# Patient Record
Sex: MALE | Race: WHITE | NOT HISPANIC OR LATINO | Employment: FULL TIME | URBAN - METROPOLITAN AREA
[De-identification: names, ages, dates, MRNs, and addresses within clinical notes are randomized per-mention and may not be internally consistent; named-entity substitution may affect disease eponyms.]

---

## 2023-08-21 PROBLEM — D36.11: Status: ACTIVE | Noted: 2023-08-21

## 2023-08-21 PROBLEM — N40.0 BENIGN PROSTATIC HYPERPLASIA: Status: ACTIVE | Noted: 2023-08-21

## 2023-08-21 PROBLEM — M10.9 GOUT: Status: ACTIVE | Noted: 2023-08-21

## 2023-08-21 PROBLEM — D36.10 NEUROMA: Status: ACTIVE | Noted: 2023-08-21

## 2023-08-21 PROBLEM — R29.810 WEAKNESS ON LEFT SIDE OF FACE: Status: ACTIVE | Noted: 2023-08-21

## 2023-08-21 PROBLEM — M48.04 THORACIC SPINAL STENOSIS: Status: ACTIVE | Noted: 2023-08-21

## 2023-08-21 PROBLEM — H90.5 SENSORINEURAL HEARING LOSS, UNILATERAL: Status: ACTIVE | Noted: 2023-08-21

## 2023-08-21 PROBLEM — R10.33 PERIUMBILICAL ABDOMINAL PAIN: Status: ACTIVE | Noted: 2023-08-21

## 2023-08-21 PROBLEM — H90.72 MIXED HEARING LOSS OF LEFT EAR: Status: ACTIVE | Noted: 2023-08-21

## 2023-08-21 PROBLEM — K42.9 UMBILICAL HERNIA WITHOUT OBSTRUCTION AND WITHOUT GANGRENE: Status: ACTIVE | Noted: 2023-08-21

## 2023-08-21 PROBLEM — H90.8 MIXED HEARING LOSS, UNILATERAL: Status: ACTIVE | Noted: 2023-08-21

## 2023-08-21 PROBLEM — M48.50XA COMPRESSION FRACTURE OF VERTEBRAL COLUMN (MULTI): Status: ACTIVE | Noted: 2023-08-21

## 2023-08-21 PROBLEM — G51.0 FACIAL PARALYSIS: Status: ACTIVE | Noted: 2023-08-21

## 2023-08-21 PROBLEM — H90.5 SENSORINEURAL HEARING LOSS OF RIGHT EAR: Status: ACTIVE | Noted: 2023-08-21

## 2023-08-21 RX ORDER — LEVOFLOXACIN 500 MG/1
1 TABLET, FILM COATED ORAL DAILY
COMMUNITY
End: 2023-10-06 | Stop reason: ALTCHOICE

## 2023-08-21 RX ORDER — OXYCODONE HYDROCHLORIDE 5 MG/1
1 TABLET ORAL EVERY 8 HOURS PRN
COMMUNITY
End: 2023-10-06 | Stop reason: ALTCHOICE

## 2023-08-21 RX ORDER — ALFUZOSIN HYDROCHLORIDE 10 MG/1
1 TABLET, EXTENDED RELEASE ORAL DAILY
COMMUNITY
End: 2023-10-06 | Stop reason: SDUPTHER

## 2023-08-21 RX ORDER — ACETAMINOPHEN 325 MG/1
2 TABLET ORAL 4 TIMES DAILY
COMMUNITY
End: 2023-10-06 | Stop reason: ALTCHOICE

## 2023-08-21 RX ORDER — ALLOPURINOL 300 MG/1
1 TABLET ORAL DAILY
COMMUNITY
End: 2023-10-06 | Stop reason: SDUPTHER

## 2023-09-29 ENCOUNTER — HOSPITAL ENCOUNTER (OUTPATIENT)
Dept: DATA CONVERSION | Facility: HOSPITAL | Age: 58
Discharge: HOME | End: 2023-09-29
Payer: COMMERCIAL

## 2023-09-29 DIAGNOSIS — R73.9 HYPERGLYCEMIA, UNSPECIFIED: ICD-10-CM

## 2023-09-29 DIAGNOSIS — Z12.5 ENCOUNTER FOR SCREENING FOR MALIGNANT NEOPLASM OF PROSTATE: ICD-10-CM

## 2023-09-29 DIAGNOSIS — Z87.39 PERSONAL HISTORY OF OTHER DISEASES OF THE MUSCULOSKELETAL SYSTEM AND CONNECTIVE TISSUE: ICD-10-CM

## 2023-09-29 DIAGNOSIS — E55.9 VITAMIN D DEFICIENCY, UNSPECIFIED: ICD-10-CM

## 2023-09-29 DIAGNOSIS — E03.9 HYPOTHYROIDISM, UNSPECIFIED: ICD-10-CM

## 2023-09-29 DIAGNOSIS — Z01.89 ENCOUNTER FOR OTHER SPECIFIED SPECIAL EXAMINATIONS: ICD-10-CM

## 2023-09-29 DIAGNOSIS — E78.2 MIXED HYPERLIPIDEMIA: ICD-10-CM

## 2023-09-29 LAB
25(OH)D3 SERPL-MCNC: 25 NG/ML (ref 31–100)
ALBUMIN SERPL-MCNC: 4.3 GM/DL (ref 3.5–5)
ALBUMIN/GLOB SERPL: 1.5 RATIO (ref 1.5–3)
ALP BLD-CCNC: 101 U/L (ref 35–125)
ALT SERPL-CCNC: 20 U/L (ref 5–40)
ANION GAP SERPL CALCULATED.3IONS-SCNC: 12 MMOL/L (ref 0–19)
APPEARANCE PLAS: NORMAL
AST SERPL-CCNC: 26 U/L (ref 5–40)
BASOPHILS # BLD AUTO: 0.02 K/UL (ref 0–0.22)
BASOPHILS NFR BLD AUTO: 0.4 % (ref 0–1)
BILIRUB DIRECT SERPL-MCNC: 0.2 MG/DL (ref 0–0.2)
BILIRUB INDIRECT SERPL-MCNC: 0.3 MG/DL (ref 0–0.8)
BILIRUB SERPL-MCNC: 0.5 MG/DL (ref 0.1–1.2)
BUN SERPL-MCNC: 20 MG/DL (ref 8–25)
BUN/CREAT SERPL: 20 RATIO (ref 8–21)
CALCIUM SERPL-MCNC: 9.5 MG/DL (ref 8.5–10.4)
CHLORIDE SERPL-SCNC: 103 MMOL/L (ref 97–107)
CHOLEST SERPL-MCNC: 172 MG/DL (ref 133–200)
CHOLEST/HDLC SERPL: 3.4 RATIO
CO2 SERPL-SCNC: 26 MMOL/L (ref 24–31)
COLOR SPUN FLD: YELLOW
CREAT SERPL-MCNC: 1 MG/DL (ref 0.4–1.6)
DEPRECATED RDW RBC AUTO: 46.2 FL (ref 37–54)
DIFFERENTIAL METHOD BLD: ABNORMAL
EOSINOPHIL # BLD AUTO: 0.09 K/UL (ref 0–0.45)
EOSINOPHIL NFR BLD: 2 % (ref 0–3)
ERYTHROCYTE [DISTWIDTH] IN BLOOD BY AUTOMATED COUNT: 14.6 % (ref 11.7–15)
FASTING STATUS PATIENT QL REPORTED: NORMAL
GFR SERPL CREATININE-BSD FRML MDRD: 88 ML/MIN/1.73 M2
GLOBULIN SER-MCNC: 2.8 G/DL (ref 1.9–3.7)
GLUCOSE SERPL-MCNC: 81 MG/DL (ref 65–99)
HBA1C MFR BLD: 5.2 % (ref 4–6)
HCT VFR BLD AUTO: 43.3 % (ref 41–50)
HDLC SERPL-MCNC: 50 MG/DL
HGB BLD-MCNC: 14.3 GM/DL (ref 13.5–16.5)
IMM GRANULOCYTES # BLD AUTO: 0.01 K/UL (ref 0–0.1)
LDLC SERPL CALC-MCNC: 111 MG/DL (ref 65–130)
LYMPHOCYTES # BLD AUTO: 1.88 K/UL (ref 1.2–3.2)
LYMPHOCYTES NFR BLD MANUAL: 41.8 % (ref 20–40)
MCH RBC QN AUTO: 28.8 PG (ref 26–34)
MCHC RBC AUTO-ENTMCNC: 33 % (ref 31–37)
MCV RBC AUTO: 87.3 FL (ref 80–100)
MONOCYTES # BLD AUTO: 0.44 K/UL (ref 0–0.8)
MONOCYTES NFR BLD MANUAL: 9.8 % (ref 0–8)
NEUTROPHILS # BLD AUTO: 2.06 K/UL
NEUTROPHILS # BLD AUTO: 2.06 K/UL (ref 1.8–7.7)
NEUTROPHILS.IMMATURE NFR BLD: 0.2 % (ref 0–1)
NEUTS SEG NFR BLD: 45.8 % (ref 50–70)
NRBC BLD-RTO: 0 /100 WBC
PLATELET # BLD AUTO: 186 K/UL (ref 150–450)
PMV BLD AUTO: 11.5 CU (ref 7–12.6)
POTASSIUM SERPL-SCNC: 5.3 MMOL/L (ref 3.4–5.1)
PROT SERPL-MCNC: 7.1 G/DL (ref 5.9–7.9)
PSA SERPL-MCNC: 0.4 NG/ML (ref 0–4.1)
RBC # BLD AUTO: 4.96 M/UL (ref 4.5–5.5)
SODIUM SERPL-SCNC: 140 MMOL/L (ref 133–145)
TRIGL SERPL-MCNC: 57 MG/DL (ref 40–150)
TSH SERPL DL<=0.05 MIU/L-ACNC: 1.15 MIU/L (ref 0.27–4.2)
URATE SERPL-MCNC: 5.6 MG/DL (ref 3.6–7.7)
WBC # BLD AUTO: 4.5 K/UL (ref 4.5–11)

## 2023-10-06 ENCOUNTER — OFFICE VISIT (OUTPATIENT)
Dept: PRIMARY CARE | Facility: CLINIC | Age: 58
End: 2023-10-06
Payer: COMMERCIAL

## 2023-10-06 VITALS
HEART RATE: 96 BPM | SYSTOLIC BLOOD PRESSURE: 132 MMHG | TEMPERATURE: 97.8 F | OXYGEN SATURATION: 97 % | DIASTOLIC BLOOD PRESSURE: 80 MMHG | WEIGHT: 293 LBS | BODY MASS INDEX: 39.19 KG/M2

## 2023-10-06 DIAGNOSIS — E87.5 HYPERKALEMIA: ICD-10-CM

## 2023-10-06 DIAGNOSIS — N40.0 BENIGN PROSTATIC HYPERPLASIA WITHOUT LOWER URINARY TRACT SYMPTOMS: ICD-10-CM

## 2023-10-06 DIAGNOSIS — Z01.89 ENCOUNTER FOR ROUTINE LABORATORY TESTING: ICD-10-CM

## 2023-10-06 DIAGNOSIS — G51.0 PARALYSIS OF ONE SIDE OF FACE: ICD-10-CM

## 2023-10-06 DIAGNOSIS — M15.9 PRIMARY OSTEOARTHRITIS INVOLVING MULTIPLE JOINTS: Primary | ICD-10-CM

## 2023-10-06 DIAGNOSIS — D36.10 NEUROMA: ICD-10-CM

## 2023-10-06 DIAGNOSIS — E66.01 CLASS 2 SEVERE OBESITY WITH SERIOUS COMORBIDITY AND BODY MASS INDEX (BMI) OF 39.0 TO 39.9 IN ADULT, UNSPECIFIED OBESITY TYPE (MULTI): ICD-10-CM

## 2023-10-06 DIAGNOSIS — Z87.81 HISTORY OF COMPRESSION FRACTURE OF SPINE: ICD-10-CM

## 2023-10-06 DIAGNOSIS — R53.83 FATIGUE, UNSPECIFIED TYPE: ICD-10-CM

## 2023-10-06 DIAGNOSIS — Z87.39 HISTORY OF GOUT: ICD-10-CM

## 2023-10-06 DIAGNOSIS — E55.9 VITAMIN D DEFICIENCY: ICD-10-CM

## 2023-10-06 PROBLEM — K42.9 UMBILICAL HERNIA WITHOUT OBSTRUCTION AND WITHOUT GANGRENE: Status: RESOLVED | Noted: 2023-08-21 | Resolved: 2023-10-06

## 2023-10-06 PROBLEM — E66.812 CLASS 2 SEVERE OBESITY WITH SERIOUS COMORBIDITY AND BODY MASS INDEX (BMI) OF 39.0 TO 39.9 IN ADULT: Status: ACTIVE | Noted: 2023-10-06

## 2023-10-06 PROBLEM — M15.0 PRIMARY OSTEOARTHRITIS INVOLVING MULTIPLE JOINTS: Status: ACTIVE | Noted: 2023-10-06

## 2023-10-06 PROBLEM — R10.33 PERIUMBILICAL ABDOMINAL PAIN: Status: RESOLVED | Noted: 2023-08-21 | Resolved: 2023-10-06

## 2023-10-06 PROBLEM — Z87.19 HISTORY OF UMBILICAL HERNIA REPAIR: Status: ACTIVE | Noted: 2022-08-01

## 2023-10-06 PROBLEM — M48.04 THORACIC SPINAL STENOSIS: Status: RESOLVED | Noted: 2023-08-21 | Resolved: 2023-10-06

## 2023-10-06 PROBLEM — Z98.890 HISTORY OF UMBILICAL HERNIA REPAIR: Status: ACTIVE | Noted: 2022-08-01

## 2023-10-06 PROCEDURE — 99214 OFFICE O/P EST MOD 30 MIN: CPT | Performed by: STUDENT IN AN ORGANIZED HEALTH CARE EDUCATION/TRAINING PROGRAM

## 2023-10-06 PROCEDURE — 3008F BODY MASS INDEX DOCD: CPT | Performed by: STUDENT IN AN ORGANIZED HEALTH CARE EDUCATION/TRAINING PROGRAM

## 2023-10-06 PROCEDURE — 1036F TOBACCO NON-USER: CPT | Performed by: STUDENT IN AN ORGANIZED HEALTH CARE EDUCATION/TRAINING PROGRAM

## 2023-10-06 RX ORDER — VIT C/E/ZN/COPPR/LUTEIN/ZEAXAN 250MG-90MG
25 CAPSULE ORAL DAILY
Qty: 30 CAPSULE | Refills: 11
Start: 2023-10-06 | End: 2024-04-12 | Stop reason: ALTCHOICE

## 2023-10-06 RX ORDER — MULTIVITAMIN
TABLET ORAL
Start: 2023-10-06 | End: 2024-04-12 | Stop reason: SDUPTHER

## 2023-10-06 RX ORDER — FINASTERIDE 5 MG/1
5 TABLET, FILM COATED ORAL DAILY
COMMUNITY
Start: 2023-07-20 | End: 2023-10-06 | Stop reason: SDUPTHER

## 2023-10-06 RX ORDER — ALFUZOSIN HYDROCHLORIDE 10 MG/1
10 TABLET, EXTENDED RELEASE ORAL DAILY
Qty: 90 TABLET | Refills: 3 | Status: SHIPPED | OUTPATIENT
Start: 2023-10-06 | End: 2024-10-05

## 2023-10-06 RX ORDER — ALLOPURINOL 300 MG/1
300 TABLET ORAL DAILY
Qty: 90 TABLET | Refills: 3 | Status: SHIPPED | OUTPATIENT
Start: 2023-10-06 | End: 2024-04-12 | Stop reason: SDUPTHER

## 2023-10-06 RX ORDER — FINASTERIDE 5 MG/1
5 TABLET, FILM COATED ORAL DAILY
Qty: 90 TABLET | Refills: 3 | Status: SHIPPED | OUTPATIENT
Start: 2023-10-06 | End: 2024-04-12 | Stop reason: SDUPTHER

## 2023-10-06 ASSESSMENT — ENCOUNTER SYMPTOMS
CARDIOVASCULAR NEGATIVE: 1
CONSTITUTIONAL NEGATIVE: 1
RESPIRATORY NEGATIVE: 1
GASTROINTESTINAL NEGATIVE: 1

## 2023-10-06 ASSESSMENT — PAIN SCALES - GENERAL: PAINLEVEL: 0-NO PAIN

## 2023-10-06 NOTE — PATIENT INSTRUCTIONS
Diagnoses and all orders for this visit:  Primary osteoarthritis involving multiple joints  Comments:  - Patient notes that, besides the gout, he does not deal with too much arthritis.  History of compression fracture of spine  Comments:  - Unclear where this came from. Will remove from medical history moving forward.  History of gout  Comments:  - Patient is doing well on the current dosing of allopurinol. Uric acid stable.  - No changes at this time.  Orders:  -     allopurinol (Zyloprim) 300 mg tablet; Take 1 tablet (300 mg) by mouth once daily.  Neuroma  Comments:  - Per patient, inoperable.  Paralysis of one side of face  Comments:  - L-face.  - Secondary to the neuroma. Chronic, stable.  Class 2 severe obesity with serious comorbidity and body mass index (BMI) of 39.0 to 39.9 in adult, unspecified obesity type (CMS/HCC)  Comments:  - Patient notes good diet and exercise program.  - Encouraged the patient to check insurance for GLP-1s (mounjaro, wegovy).  Benign prostatic hyperplasia without lower urinary tract symptoms  -     finasteride (Proscar) 5 mg tablet; Take 1 tablet (5 mg) by mouth once daily.  -     alfuzosin (Uroxatral) 10 mg 24 hr tablet; Take 1 tablet (10 mg) by mouth once daily.  Vitamin D deficiency  Comments:  - Increase at-home supplementation.  Orders:  -     multivitamin tablet; One-A-Day Men's 50+ Complete Multivitamin.  -     cholecalciferol (Vitamin D-3) 25 MCG (1000 UT) capsule; Take 1 capsule (25 mcg) by mouth once daily.  -     Vitamin D 25-Hydroxy,Total (for eval of Vitamin D levels); Future  Hyperkalemia  Comments:  - Patient noting that he takes supplemental potassium. Told to stop this today.  - Will recheck BMP in one week.  Orders:  -     Basic Metabolic Panel; Future  Encounter for routine laboratory testing  Comments:  - Labwork for today mostly looked great.  - Will order labwork for next visit.  Orders:  -     Basic Metabolic Panel; Future  -     Hepatic Function Panel;  Future  -     Vitamin D 25-Hydroxy,Total (for eval of Vitamin D levels); Future  -     Testosterone; Future  Fatigue, unspecified type  -     Testosterone; Future

## 2023-10-06 NOTE — PROGRESS NOTES
[facility]: [facility]  PROGRESS NOTE      Quincy Villeda is a 57 y.o. male that is presenting today for Establish Care (NP switch from [facility]).    Assessment/Plan   Diagnoses and all orders for this visit:  Primary osteoarthritis involving multiple joints  Comments:  - Patient notes that, besides the gout, he does not deal with too much arthritis.  History of compression fracture of spine  Comments:  - Unclear where this came from. Will remove from medical history moving forward.  History of gout  Comments:  - Patient is doing well on the current dosing of allopurinol. Uric acid stable.  - No changes at this time.  Orders:  -     allopurinol (Zyloprim) 300 mg tablet; Take 1 tablet (300 mg) by mouth once daily.  Neuroma  Comments:  - Per patient, inoperable.  Paralysis of one side of face  Comments:  - L-face.  - Secondary to the neuroma. Chronic, stable.  Class 2 severe obesity with serious comorbidity and body mass index (BMI) of 39.0 to 39.9 in adult, unspecified obesity type (CMS/[facility])  Comments:  - Patient notes good diet and exercise program.  - Encouraged the patient to check insurance for GLP-1s (mounjaro, wegovy).  Benign prostatic hyperplasia without lower urinary tract symptoms  -     finasteride (Proscar) 5 mg tablet; Take 1 tablet (5 mg) by mouth once daily.  -     alfuzosin (Uroxatral) 10 mg 24 hr tablet; Take 1 tablet (10 mg) by mouth once daily.  Vitamin D deficiency  Comments:  - Increase at-home supplementation.  Orders:  -     multivitamin tablet; One-A-Day Men's 50+ Complete Multivitamin.  -     cholecalciferol (Vitamin D-3) 25 MCG (1000 UT) capsule; Take 1 capsule (25 mcg) by mouth once daily.  -     Vitamin D 25-Hydroxy,Total (for eval of Vitamin D levels); Future  Hyperkalemia  Comments:  - Patient noting that he takes supplemental potassium. Told to stop this today.  - Will recheck BMP in one week.  Orders:  -     Basic Metabolic Panel; Future  Encounter for  routine laboratory testing  -     Basic Metabolic Panel; Future  -     Hepatic Function Panel; Future  -     Vitamin D 25-Hydroxy,Total (for eval of Vitamin D levels); Future  -     Testosterone; Future  Fatigue, unspecified type  -     Testosterone; Future    Subjective   - Patient is here today to establish care.  - The patient feels well and denies any acute symptoms or concerns at this time.   - The patient denies any changes or progression of their chronic medical problems.  - The patient denies any problems or concerns with their medications.      Review of Systems   Constitutional: Negative.    Respiratory: Negative.     Cardiovascular: Negative.    Gastrointestinal: Negative.       Objective   Vitals:    10/06/23 0802   BP: 132/80   Pulse: 96   Temp: 36.6 °C (97.8 °F)   SpO2: 97%      Body mass index is 39.19 kg/m².  Physical Exam  Constitutional:       General: He is not in acute distress.  Neck:      Vascular: No carotid bruit.   Cardiovascular:      Rate and Rhythm: Normal rate and regular rhythm.      Heart sounds: Normal heart sounds.   Pulmonary:      Effort: Pulmonary effort is normal.      Breath sounds: Normal breath sounds.   Musculoskeletal:         General: No swelling.   Neurological:      Mental Status: He is alert. Mental status is at baseline.   Psychiatric:         Mood and Affect: Mood normal.       Diagnostic Results   Lab Results   Component Value Date    GLUCOSE 81 09/29/2023    CALCIUM 9.5 09/29/2023     09/29/2023    K 5.3 (H) 09/29/2023    CO2 26 09/29/2023     09/29/2023    BUN 20 09/29/2023    CREATININE 1.0 09/29/2023     Lab Results   Component Value Date    ALT 20 09/29/2023    AST 26 09/29/2023    ALKPHOS 101 09/29/2023    BILITOT 0.5 09/29/2023     Lab Results   Component Value Date    WBC 4.5 09/29/2023    HGB 14.3 09/29/2023    HCT 43.3 09/29/2023    MCV 87.3 09/29/2023     09/29/2023     Lab Results   Component Value Date    CHOL 172 09/29/2023    CHOL 192  "05/27/2022    CHOL 118 (L) 03/30/2021     Lab Results   Component Value Date    HDL 50 09/29/2023    HDL 42 05/27/2022    HDL 41 03/30/2021     Lab Results   Component Value Date    LDLCALC 111 09/29/2023    LDLCALC 136 (H) 05/27/2022    LDLCALC 67 03/30/2021     Lab Results   Component Value Date    TRIG 57 09/29/2023    TRIG 69 05/27/2022    TRIG 52 03/30/2021     No components found for: \"CHOLHDL\"  Lab Results   Component Value Date    HGBA1C 5.2 09/29/2023     Other labs not included in the list above were reviewed either before or during this encounter.    History    Past Medical History:   Diagnosis Date    History of umbilical hernia repair 08/2022     Past Surgical History:   Procedure Laterality Date    OTHER SURGICAL HISTORY  10/25/2013    Oral Surgery    OTHER SURGICAL HISTORY  11/11/2022    Umbilical hernia repair    ROTATOR CUFF REPAIR  10/25/2013    Rotator Cuff Repair     Family History   Problem Relation Name Age of Onset    No Known Problems Mother      COPD Father      Diabetes Father      No Known Problems Sister       Social History     Socioeconomic History    Marital status:      Spouse name: Not on file    Number of children: Not on file    Years of education: Not on file    Highest education level: Not on file   Occupational History    Not on file   Tobacco Use    Smoking status: Never    Smokeless tobacco: Never   Substance and Sexual Activity    Alcohol use: Never    Drug use: Never    Sexual activity: Not on file   Other Topics Concern    Not on file   Social History Narrative    Not on file     Social Determinants of Health     Financial Resource Strain: Not on file   Food Insecurity: Not on file   Transportation Needs: Not on file   Physical Activity: Not on file   Stress: Not on file   Social Connections: Not on file   Intimate Partner Violence: Not on file   Housing Stability: Not on file     No Known Allergies  Current Outpatient Medications on File Prior to Visit   Medication " Sig Dispense Refill    [DISCONTINUED] alfuzosin (Uroxatral) 10 mg 24 hr tablet 1 tablet (10 mg) once daily. Immediately after same meal      [DISCONTINUED] allopurinol (Zyloprim) 300 mg tablet Take 1 tablet (300 mg) by mouth once daily.      [DISCONTINUED] finasteride (Proscar) 5 mg tablet Take 1 tablet (5 mg) by mouth once daily.      [DISCONTINUED] acetaminophen (Tylenol) 325 mg tablet Take 2 tablets (650 mg) by mouth 4 times a day.      [DISCONTINUED] levoFLOXacin (Levaquin) 500 mg tablet Take 1 tablet (500 mg) by mouth once daily.      [DISCONTINUED] oxyCODONE (Roxicodone) 5 mg immediate release tablet Take 1 tablet (5 mg) by mouth every 8 hours if needed for severe pain (7 - 10).      [DISCONTINUED] sennosides-docusate sodium 8.6-50 mg capsule Take 2 tablets by mouth as needed at bedtime (for: constipation).       No current facility-administered medications on file prior to visit.     Immunization History   Administered Date(s) Administered    Moderna SARS-CoV-2 Vaccination 03/25/2021, 04/22/2021, 12/02/2021    Pfizer COVID-19 vaccine, bivalent, age 12 years and older (30 mcg/0.3 mL) 09/30/2022    Zoster vaccine, recombinant, adult (SHINGRIX) 03/02/2021, 06/02/2021     Patient's medical history was reviewed and updated either before or during this encounter.    Jorgito Sullivan MD

## 2023-10-13 ENCOUNTER — LAB (OUTPATIENT)
Dept: LAB | Facility: LAB | Age: 58
End: 2023-10-13
Payer: COMMERCIAL

## 2023-10-13 DIAGNOSIS — E87.5 HYPERKALEMIA: ICD-10-CM

## 2023-10-13 LAB
ANION GAP SERPL CALC-SCNC: 14 MMOL/L (ref 10–20)
BUN SERPL-MCNC: 20 MG/DL (ref 6–23)
CALCIUM SERPL-MCNC: 8.8 MG/DL (ref 8.6–10.3)
CHLORIDE SERPL-SCNC: 106 MMOL/L (ref 98–107)
CO2 SERPL-SCNC: 26 MMOL/L (ref 21–32)
CREAT SERPL-MCNC: 0.99 MG/DL (ref 0.5–1.3)
GFR SERPL CREATININE-BSD FRML MDRD: 89 ML/MIN/1.73M*2
GLUCOSE SERPL-MCNC: 80 MG/DL (ref 74–99)
POTASSIUM SERPL-SCNC: 4.1 MMOL/L (ref 3.5–5.3)
SODIUM SERPL-SCNC: 142 MMOL/L (ref 136–145)

## 2023-10-13 PROCEDURE — 36415 COLL VENOUS BLD VENIPUNCTURE: CPT

## 2023-10-13 PROCEDURE — 80048 BASIC METABOLIC PNL TOTAL CA: CPT

## 2024-01-12 ENCOUNTER — LAB (OUTPATIENT)
Dept: LAB | Facility: LAB | Age: 59
End: 2024-01-12
Payer: COMMERCIAL

## 2024-01-12 DIAGNOSIS — Z01.89 ENCOUNTER FOR ROUTINE LABORATORY TESTING: ICD-10-CM

## 2024-01-12 DIAGNOSIS — E55.9 VITAMIN D DEFICIENCY: ICD-10-CM

## 2024-01-12 DIAGNOSIS — R53.83 FATIGUE, UNSPECIFIED TYPE: ICD-10-CM

## 2024-01-12 LAB
25(OH)D3 SERPL-MCNC: 34 NG/ML (ref 30–100)
ALBUMIN SERPL BCP-MCNC: 4.3 G/DL (ref 3.4–5)
ALP SERPL-CCNC: 91 U/L (ref 33–120)
ALT SERPL W P-5'-P-CCNC: 16 U/L (ref 10–52)
ANION GAP SERPL CALC-SCNC: 15 MMOL/L (ref 10–20)
AST SERPL W P-5'-P-CCNC: 20 U/L (ref 9–39)
BILIRUB DIRECT SERPL-MCNC: 0.1 MG/DL (ref 0–0.3)
BILIRUB SERPL-MCNC: 0.5 MG/DL (ref 0–1.2)
BUN SERPL-MCNC: 18 MG/DL (ref 6–23)
CALCIUM SERPL-MCNC: 9 MG/DL (ref 8.6–10.3)
CHLORIDE SERPL-SCNC: 103 MMOL/L (ref 98–107)
CO2 SERPL-SCNC: 28 MMOL/L (ref 21–32)
CREAT SERPL-MCNC: 0.98 MG/DL (ref 0.5–1.3)
EGFRCR SERPLBLD CKD-EPI 2021: 89 ML/MIN/1.73M*2
GLUCOSE SERPL-MCNC: 85 MG/DL (ref 74–99)
POTASSIUM SERPL-SCNC: 4.1 MMOL/L (ref 3.5–5.3)
PROT SERPL-MCNC: 6.6 G/DL (ref 6.4–8.2)
SODIUM SERPL-SCNC: 142 MMOL/L (ref 136–145)
TESTOST SERPL-MCNC: 382 NG/DL (ref 240–1000)

## 2024-01-12 PROCEDURE — 36415 COLL VENOUS BLD VENIPUNCTURE: CPT

## 2024-01-12 PROCEDURE — 82248 BILIRUBIN DIRECT: CPT

## 2024-01-12 PROCEDURE — 84403 ASSAY OF TOTAL TESTOSTERONE: CPT

## 2024-01-12 PROCEDURE — 82306 VITAMIN D 25 HYDROXY: CPT

## 2024-01-12 PROCEDURE — 80053 COMPREHEN METABOLIC PANEL: CPT

## 2024-04-11 ASSESSMENT — PROMIS GLOBAL HEALTH SCALE
RATE_MENTAL_HEALTH: FAIR
CARRYOUT_SOCIAL_ACTIVITIES: GOOD
RATE_AVERAGE_PAIN: 2
RATE_PHYSICAL_HEALTH: FAIR
RATE_QUALITY_OF_LIFE: GOOD
CARRYOUT_PHYSICAL_ACTIVITIES: COMPLETELY
EMOTIONAL_PROBLEMS: RARELY
RATE_AVERAGE_FATIGUE: MILD
RATE_SOCIAL_SATISFACTION: FAIR
RATE_GENERAL_HEALTH: GOOD

## 2024-04-12 ENCOUNTER — OFFICE VISIT (OUTPATIENT)
Dept: PRIMARY CARE | Facility: CLINIC | Age: 59
End: 2024-04-12
Payer: COMMERCIAL

## 2024-04-12 ENCOUNTER — LAB (OUTPATIENT)
Dept: LAB | Facility: LAB | Age: 59
End: 2024-04-12
Payer: COMMERCIAL

## 2024-04-12 VITALS
HEIGHT: 73 IN | DIASTOLIC BLOOD PRESSURE: 80 MMHG | OXYGEN SATURATION: 98 % | SYSTOLIC BLOOD PRESSURE: 150 MMHG | BODY MASS INDEX: 40.56 KG/M2 | TEMPERATURE: 97.3 F | WEIGHT: 306 LBS | HEART RATE: 102 BPM

## 2024-04-12 DIAGNOSIS — R53.82 CHRONIC FATIGUE: ICD-10-CM

## 2024-04-12 DIAGNOSIS — R06.83 SNORING: ICD-10-CM

## 2024-04-12 DIAGNOSIS — Z12.12 ENCOUNTER FOR COLORECTAL CANCER SCREENING: ICD-10-CM

## 2024-04-12 DIAGNOSIS — Z12.5 ENCOUNTER FOR PROSTATE CANCER SCREENING: ICD-10-CM

## 2024-04-12 DIAGNOSIS — Z00.00 ANNUAL PHYSICAL EXAM: Primary | ICD-10-CM

## 2024-04-12 DIAGNOSIS — Z12.11 ENCOUNTER FOR COLORECTAL CANCER SCREENING: ICD-10-CM

## 2024-04-12 DIAGNOSIS — N40.0 BENIGN PROSTATIC HYPERPLASIA WITHOUT LOWER URINARY TRACT SYMPTOMS: ICD-10-CM

## 2024-04-12 DIAGNOSIS — E78.2 MIXED HYPERLIPIDEMIA: ICD-10-CM

## 2024-04-12 DIAGNOSIS — Z87.39 HISTORY OF GOUT: ICD-10-CM

## 2024-04-12 DIAGNOSIS — R73.9 HYPERGLYCEMIA: ICD-10-CM

## 2024-04-12 DIAGNOSIS — Z01.89 ENCOUNTER FOR ROUTINE LABORATORY TESTING: ICD-10-CM

## 2024-04-12 DIAGNOSIS — Z13.6 ENCOUNTER FOR SCREENING FOR CARDIOVASCULAR DISORDERS: ICD-10-CM

## 2024-04-12 DIAGNOSIS — R03.0 ELEVATED BLOOD PRESSURE READING: ICD-10-CM

## 2024-04-12 DIAGNOSIS — M15.9 PRIMARY OSTEOARTHRITIS INVOLVING MULTIPLE JOINTS: ICD-10-CM

## 2024-04-12 DIAGNOSIS — E55.9 VITAMIN D DEFICIENCY: ICD-10-CM

## 2024-04-12 DIAGNOSIS — R60.0 BILATERAL LOWER EXTREMITY EDEMA: ICD-10-CM

## 2024-04-12 DIAGNOSIS — Z23 ENCOUNTER FOR IMMUNIZATION: ICD-10-CM

## 2024-04-12 DIAGNOSIS — E66.01 CLASS 3 SEVERE OBESITY WITHOUT SERIOUS COMORBIDITY WITH BODY MASS INDEX (BMI) OF 40.0 TO 44.9 IN ADULT, UNSPECIFIED OBESITY TYPE (MULTI): ICD-10-CM

## 2024-04-12 PROBLEM — M19.90 OA (OSTEOARTHRITIS): Status: ACTIVE | Noted: 2023-10-06

## 2024-04-12 PROBLEM — E66.9 OBESITY: Status: ACTIVE | Noted: 2023-10-06

## 2024-04-12 LAB
25(OH)D3 SERPL-MCNC: 21 NG/ML (ref 31–100)
ALBUMIN SERPL-MCNC: 4.5 G/DL (ref 3.5–5)
ALP BLD-CCNC: 98 U/L (ref 35–125)
ALT SERPL-CCNC: 25 U/L (ref 5–40)
ANION GAP SERPL CALC-SCNC: 18 MMOL/L
AST SERPL-CCNC: 26 U/L (ref 5–40)
BASOPHILS # BLD AUTO: 0.03 X10*3/UL (ref 0–0.1)
BASOPHILS NFR BLD AUTO: 0.7 %
BILIRUB DIRECT SERPL-MCNC: <0.2 MG/DL (ref 0–0.2)
BILIRUB SERPL-MCNC: 0.6 MG/DL (ref 0.1–1.2)
BUN SERPL-MCNC: 20 MG/DL (ref 8–25)
CALCIUM SERPL-MCNC: 9.7 MG/DL (ref 8.5–10.4)
CHLORIDE SERPL-SCNC: 103 MMOL/L (ref 97–107)
CHOLEST SERPL-MCNC: 174 MG/DL (ref 133–200)
CHOLEST/HDLC SERPL: 3.3 {RATIO}
CO2 SERPL-SCNC: 23 MMOL/L (ref 24–31)
CREAT SERPL-MCNC: 1.1 MG/DL (ref 0.4–1.6)
EGFRCR SERPLBLD CKD-EPI 2021: 78 ML/MIN/1.73M*2
EOSINOPHIL # BLD AUTO: 0.06 X10*3/UL (ref 0–0.7)
EOSINOPHIL NFR BLD AUTO: 1.4 %
ERYTHROCYTE [DISTWIDTH] IN BLOOD BY AUTOMATED COUNT: 15.7 % (ref 11.5–14.5)
EST. AVERAGE GLUCOSE BLD GHB EST-MCNC: 108 MG/DL
GLUCOSE SERPL-MCNC: 94 MG/DL (ref 65–99)
HBA1C MFR BLD: 5.4 %
HCT VFR BLD AUTO: 44 % (ref 41–52)
HDLC SERPL-MCNC: 53 MG/DL
HGB BLD-MCNC: 14.8 G/DL (ref 13.5–17.5)
IMM GRANULOCYTES # BLD AUTO: 0.02 X10*3/UL (ref 0–0.7)
IMM GRANULOCYTES NFR BLD AUTO: 0.5 % (ref 0–0.9)
LDLC SERPL CALC-MCNC: 107 MG/DL (ref 65–130)
LYMPHOCYTES # BLD AUTO: 1.11 X10*3/UL (ref 1.2–4.8)
LYMPHOCYTES NFR BLD AUTO: 25.9 %
MCH RBC QN AUTO: 29.4 PG (ref 26–34)
MCHC RBC AUTO-ENTMCNC: 33.6 G/DL (ref 32–36)
MCV RBC AUTO: 87 FL (ref 80–100)
MONOCYTES # BLD AUTO: 0.42 X10*3/UL (ref 0.1–1)
MONOCYTES NFR BLD AUTO: 9.8 %
NEUTROPHILS # BLD AUTO: 2.65 X10*3/UL (ref 1.2–7.7)
NEUTROPHILS NFR BLD AUTO: 61.7 %
NRBC BLD-RTO: 0 /100 WBCS (ref 0–0)
PLATELET # BLD AUTO: 183 X10*3/UL (ref 150–450)
POTASSIUM SERPL-SCNC: 4.3 MMOL/L (ref 3.4–5.1)
PROT SERPL-MCNC: 6.6 G/DL (ref 5.9–7.9)
PSA SERPL-MCNC: 0.3 NG/ML
RBC # BLD AUTO: 5.04 X10*6/UL (ref 4.5–5.9)
SODIUM SERPL-SCNC: 144 MMOL/L (ref 133–145)
TRIGL SERPL-MCNC: 68 MG/DL (ref 40–150)
TSH SERPL DL<=0.05 MIU/L-ACNC: 1.1 MIU/L (ref 0.27–4.2)
URATE SERPL-MCNC: 6.2 MG/DL (ref 3.6–7.7)
WBC # BLD AUTO: 4.3 X10*3/UL (ref 4.4–11.3)

## 2024-04-12 PROCEDURE — 80053 COMPREHEN METABOLIC PANEL: CPT

## 2024-04-12 PROCEDURE — 84153 ASSAY OF PSA TOTAL: CPT

## 2024-04-12 PROCEDURE — 84443 ASSAY THYROID STIM HORMONE: CPT

## 2024-04-12 PROCEDURE — 36415 COLL VENOUS BLD VENIPUNCTURE: CPT

## 2024-04-12 PROCEDURE — 82306 VITAMIN D 25 HYDROXY: CPT

## 2024-04-12 PROCEDURE — 3008F BODY MASS INDEX DOCD: CPT | Performed by: STUDENT IN AN ORGANIZED HEALTH CARE EDUCATION/TRAINING PROGRAM

## 2024-04-12 PROCEDURE — 90715 TDAP VACCINE 7 YRS/> IM: CPT | Performed by: STUDENT IN AN ORGANIZED HEALTH CARE EDUCATION/TRAINING PROGRAM

## 2024-04-12 PROCEDURE — 84402 ASSAY OF FREE TESTOSTERONE: CPT

## 2024-04-12 PROCEDURE — 84550 ASSAY OF BLOOD/URIC ACID: CPT

## 2024-04-12 PROCEDURE — 1036F TOBACCO NON-USER: CPT | Performed by: STUDENT IN AN ORGANIZED HEALTH CARE EDUCATION/TRAINING PROGRAM

## 2024-04-12 PROCEDURE — 80061 LIPID PANEL: CPT

## 2024-04-12 PROCEDURE — 99396 PREV VISIT EST AGE 40-64: CPT | Performed by: STUDENT IN AN ORGANIZED HEALTH CARE EDUCATION/TRAINING PROGRAM

## 2024-04-12 PROCEDURE — 83036 HEMOGLOBIN GLYCOSYLATED A1C: CPT

## 2024-04-12 PROCEDURE — 82248 BILIRUBIN DIRECT: CPT

## 2024-04-12 PROCEDURE — 85025 COMPLETE CBC W/AUTO DIFF WBC: CPT

## 2024-04-12 RX ORDER — ALLOPURINOL 300 MG/1
300 TABLET ORAL DAILY
Qty: 90 TABLET | Refills: 3 | Status: SHIPPED | OUTPATIENT
Start: 2024-04-12 | End: 2025-04-12

## 2024-04-12 RX ORDER — MULTIVITAMIN
TABLET ORAL
Start: 2024-04-12

## 2024-04-12 RX ORDER — FINASTERIDE 5 MG/1
5 TABLET, FILM COATED ORAL DAILY
Qty: 90 TABLET | Refills: 3 | Status: SHIPPED | OUTPATIENT
Start: 2024-04-12 | End: 2025-04-12

## 2024-04-12 ASSESSMENT — ENCOUNTER SYMPTOMS
CONSTITUTIONAL NEGATIVE: 1
MUSCULOSKELETAL NEGATIVE: 1
NEUROLOGICAL NEGATIVE: 1
RESPIRATORY NEGATIVE: 1
HEMATOLOGIC/LYMPHATIC NEGATIVE: 1
EYES NEGATIVE: 1
CARDIOVASCULAR NEGATIVE: 1
ENDOCRINE NEGATIVE: 1
PSYCHIATRIC NEGATIVE: 1
GASTROINTESTINAL NEGATIVE: 1
ALLERGIC/IMMUNOLOGIC NEGATIVE: 1

## 2024-04-12 ASSESSMENT — PATIENT HEALTH QUESTIONNAIRE - PHQ9
1. LITTLE INTEREST OR PLEASURE IN DOING THINGS: NOT AT ALL
2. FEELING DOWN, DEPRESSED OR HOPELESS: NOT AT ALL
SUM OF ALL RESPONSES TO PHQ9 QUESTIONS 1 AND 2: 0

## 2024-04-12 ASSESSMENT — PAIN SCALES - GENERAL: PAINLEVEL: 0-NO PAIN

## 2024-04-12 NOTE — PATIENT INSTRUCTIONS
[email or web address]     Discussed routine and/or preventative care with the patient as outlined below:  - Labwork:   - Patient appears to be due for labwork. Ordered today.    - Will order labwork for the patient to get one week prior to the next appointment.  - Imaging:   - Colorectal Cancer: Patient states that he will be due in a couple of years. I do not currently have his most recent colonoscopy report. Will work on tracking this down.  - Patient's ASCVD risk > 5.0%. Discussed with him today that this does indicate that we should consider the addition of cholesterol medication (statin). That being said, I think that this patient is a fantastic candidate for further risk stratification with a Coronary Artery Calcium Score. Ordered today.  - Patient does not appear to be due for routine imaging at this time.  - Immunizations:   - Encouraged the tetanus (TDAP) booster.   - Discussed seasonal immunizations, including the influenza and COVID-19 immunizations.   - Significant medication and problem list reconciliation done today.  - Encouraged continued diet and exercise modification. Patient notes that he is becoming somewhat frustrated with his weight: he states that he is exercising multiple days / week for a total of roughly 150 minutes / week as well as making significant dietary changes without significant improvement in weight. Discussed GLP-1s again with the patient; however, these do not appear to be covered at this time.  - Patient's initial blood pressure reading was elevated. Rechecked today, although improved, it was still somewhat elevated. Will need to have the patient start checking intermittently at home. I will also bring the patient back for a nursing visit in one month for another check.  - Patient with bilateral lower extremity edema on physical exam. Due to this alongside the patient's elevated blood pressure today, I think that it is reasonable to pursue a sleep  evaluation. Referral placed today.  - Patient otherwise feeling well and denying any concerns.

## 2024-04-12 NOTE — Clinical Note
Colonoscopy. Either [facility] > [facility]. Roughly 8 years ago. Please track down report.

## 2024-04-12 NOTE — PROGRESS NOTES
[facility]: [facility]  PHYSICAL EXAM      Quincy Villeda is a 58 y.o. male that is presenting today for Annual Exam.    Assessment/Plan   Diagnoses and all orders for this visit:  Annual physical exam  -     Follow Up In Primary Care; Future  Encounter for screening for cardiovascular disorders  -     CT cardiac scoring wo IV contrast; Future  Encounter for colorectal cancer screening  Encounter for routine laboratory testing  -     CBC and Auto Differential; Future  -     Hepatic Function Panel; Future  -     Basic Metabolic Panel; Future  -     Lipid Panel; Future  -     Hemoglobin A1C; Future  -     Vitamin D 25-Hydroxy,Total (for eval of Vitamin D levels); Future  -     TSH with reflex to Free T4 if abnormal; Future  -     Prostate Specific Antigen; Future  -     Uric Acid; Future  -     Testosterone, total and free; Future  -     CBC and Auto Differential; Future  -     Basic Metabolic Panel; Future  -     Hepatic Function Panel; Future  -     Lipid Panel; Future  -     Hemoglobin A1C; Future  -     Vitamin D 25-Hydroxy,Total (for eval of Vitamin D levels); Future  -     TSH with reflex to Free T4 if abnormal; Future  -     Prostate Specific Antigen; Future  -     Uric Acid; Future  -     Testosterone, total and free; Future  Mixed hyperlipidemia  -     Lipid Panel; Future  -     Lipid Panel; Future  -     CT cardiac scoring wo IV contrast; Future  Chronic fatigue  -     CBC and Auto Differential; Future  -     TSH with reflex to Free T4 if abnormal; Future  -     Testosterone, total and free; Future  -     CBC and Auto Differential; Future  -     TSH with reflex to Free T4 if abnormal; Future  -     Testosterone, total and free; Future  Hyperglycemia  -     Hemoglobin A1C; Future  -     Hemoglobin A1C; Future  Encounter for prostate cancer screening  -     Prostate Specific Antigen; Future  -     Prostate Specific Antigen; Future  Encounter for immunization  -     Tdap vaccine, age 7  years and older  Class 3 severe obesity without serious comorbidity with body mass index (BMI) of 40.0 to 44.9 in adult, unspecified obesity type (CMS/HCC)  -     CT cardiac scoring wo IV contrast; Future  Benign prostatic hyperplasia without lower urinary tract symptoms  -     finasteride (Proscar) 5 mg tablet; Take 1 tablet (5 mg) by mouth once daily.  -     Prostate Specific Antigen; Future  -     Prostate Specific Antigen; Future  Primary osteoarthritis involving multiple joints  Elevated blood pressure reading  -     Follow Up In Primary Care; Future  -     Referral to Adult Sleep Medicine; Future  Bilateral lower extremity edema  -     Referral to Adult Sleep Medicine; Future  Vitamin D deficiency  -     multivitamin tablet; One-A-Day Men's 50+ Complete Multivitamin.  -     Vitamin D 25-Hydroxy,Total (for eval of Vitamin D levels); Future  -     Vitamin D 25-Hydroxy,Total (for eval of Vitamin D levels); Future  History of gout  -     allopurinol (Zyloprim) 300 mg tablet; Take 1 tablet (300 mg) by mouth once daily.  -     Uric Acid; Future  -     Uric Acid; Future  Snoring  -     Referral to Adult Sleep Medicine; Future    Discussed routine and/or preventative care with the patient as outlined below:  - Labwork:   - Patient appears to be due for labwork. Ordered today.    - Will order labwork for the patient to get one week prior to the next appointment.  - Imaging:   - Colorectal Cancer: Patient states that he will be due in a couple of years. I do not currently have his most recent colonoscopy report. Will work on tracking this down.  - Patient's ASCVD risk > 5.0%. Discussed with him today that this does indicate that we should consider the addition of cholesterol medication (statin). That being said, I think that this patient is a fantastic candidate for further risk stratification with a Coronary Artery Calcium Score. Ordered today.  - Patient does not appear to be due for routine imaging at this time.  -  Immunizations:   - Encouraged the tetanus (TDAP) booster.   - Discussed seasonal immunizations, including the influenza and COVID-19 immunizations.   - Significant medication and problem list reconciliation done today.  - Encouraged continued diet and exercise modification. Patient notes that he is becoming somewhat frustrated with his weight: he states that he is exercising multiple days / week for a total of roughly 150 minutes / week as well as making significant dietary changes without significant improvement in weight. Discussed GLP-1s again with the patient; however, these do not appear to be covered at this time.  - Patient's initial blood pressure reading was elevated. Rechecked today, although improved, it was still somewhat elevated. Will need to have the patient start checking intermittently at home. I will also bring the patient back for a nursing visit in one month for another check.  - Patient with bilateral lower extremity edema on physical exam. Due to this alongside the patient's elevated blood pressure today, I think that it is reasonable to pursue a sleep evaluation. Referral placed today.  - Patient otherwise feeling well and denying any concerns.    Subjective   - The patient otherwise feels well and denies any acute symptoms or concerns at this time.  - The patient denies any changes or progression of their chronic medical problems.  - The patient denies any problems or concerns with their medications.      Review of Systems   Constitutional: Negative.    HENT: Negative.     Eyes: Negative.    Respiratory: Negative.     Cardiovascular: Negative.    Gastrointestinal: Negative.    Endocrine: Negative.    Genitourinary: Negative.    Musculoskeletal: Negative.    Skin: Negative.    Allergic/Immunologic: Negative.    Neurological: Negative.    Hematological: Negative.    Psychiatric/Behavioral: Negative.     All other systems reviewed and are negative.     Objective   Vitals:    04/12/24 0809   BP:  150/80   Pulse: 102   Temp: 36.3 °C (97.3 °F)   SpO2: 98%     Body mass index is 40.93 kg/m².  Physical Exam  Vitals and nursing note reviewed.   Constitutional:       General: He is not in acute distress.     Appearance: Normal appearance. He is not ill-appearing.   HENT:      Head: Normocephalic and atraumatic.      Right Ear: Tympanic membrane, ear canal and external ear normal. There is no impacted cerumen.      Left Ear: Tympanic membrane, ear canal and external ear normal. There is no impacted cerumen.      Nose: Nose normal.      Mouth/Throat:      Mouth: Mucous membranes are moist.      Pharynx: Oropharynx is clear. No oropharyngeal exudate or posterior oropharyngeal erythema.   Eyes:      General: No scleral icterus.        Right eye: No discharge.         Left eye: No discharge.      Extraocular Movements: Extraocular movements intact.      Conjunctiva/sclera: Conjunctivae normal.      Pupils: Pupils are equal, round, and reactive to light.   Neck:      Vascular: No carotid bruit.   Cardiovascular:      Rate and Rhythm: Normal rate and regular rhythm.      Pulses: Normal pulses.      Heart sounds: Normal heart sounds. No murmur heard.     No friction rub. No gallop.   Pulmonary:      Effort: Pulmonary effort is normal. No respiratory distress.      Breath sounds: Normal breath sounds.   Abdominal:      General: Abdomen is flat. Bowel sounds are normal.      Palpations: Abdomen is soft.      Tenderness: There is no abdominal tenderness.      Hernia: No hernia is present.   Musculoskeletal:         General: No swelling. Normal range of motion.      Cervical back: Normal range of motion.   Lymphadenopathy:      Cervical: No cervical adenopathy.   Skin:     General: Skin is warm and dry.      Coloration: Skin is not jaundiced.      Findings: No rash.   Neurological:      General: No focal deficit present.      Mental Status: He is alert and oriented to person, place, and time. Mental status is at baseline.  "  Psychiatric:         Mood and Affect: Mood normal.         Behavior: Behavior normal.       Diagnostic Results   Lab Results   Component Value Date    GLUCOSE 85 01/12/2024    CALCIUM 9.0 01/12/2024     01/12/2024    K 4.1 01/12/2024    CO2 28 01/12/2024     01/12/2024    BUN 18 01/12/2024    CREATININE 0.98 01/12/2024     Lab Results   Component Value Date    ALT 16 01/12/2024    AST 20 01/12/2024    ALKPHOS 91 01/12/2024    BILITOT 0.5 01/12/2024     Lab Results   Component Value Date    WBC 4.5 09/29/2023    HGB 14.3 09/29/2023    HCT 43.3 09/29/2023    MCV 87.3 09/29/2023     09/29/2023     Lab Results   Component Value Date    CHOL 172 09/29/2023    CHOL 192 05/27/2022    CHOL 118 (L) 03/30/2021     Lab Results   Component Value Date    HDL 50 09/29/2023    HDL 42 05/27/2022    HDL 41 03/30/2021     Lab Results   Component Value Date    LDLCALC 111 09/29/2023    LDLCALC 136 (H) 05/27/2022    LDLCALC 67 03/30/2021     Lab Results   Component Value Date    TRIG 57 09/29/2023    TRIG 69 05/27/2022    TRIG 52 03/30/2021     No components found for: \"CHOLHDL\"  Lab Results   Component Value Date    HGBA1C 5.2 09/29/2023     Other labs not included in the list above were reviewed either before or during this encounter.    History   Past Medical History:   Diagnosis Date    Headache 1980    History of umbilical hernia repair 08/2022    HL (hearing loss) 2010     Past Surgical History:   Procedure Laterality Date    HERNIA REPAIR  2022    OTHER SURGICAL HISTORY  10/25/2013    Oral Surgery    OTHER SURGICAL HISTORY  11/11/2022    Umbilical hernia repair    ROTATOR CUFF REPAIR  10/25/2013    Rotator Cuff Repair    SINUS SURGERY  2014    VASECTOMY  1997    WISDOM TOOTH EXTRACTION  1992     Family History   Problem Relation Name Age of Onset    No Known Problems Mother      COPD Father Quincy Weller Sr.     Diabetes Father Quincy Villeda Sr.     No Known Problems Sister       Social History "     Socioeconomic History    Marital status:      Spouse name: Not on file    Number of children: Not on file    Years of education: Not on file    Highest education level: Not on file   Occupational History    Not on file   Tobacco Use    Smoking status: Never    Smokeless tobacco: Never   Vaping Use    Vaping status: Never Used   Substance and Sexual Activity    Alcohol use: Never    Drug use: Never    Sexual activity: Not Currently     Partners: Female     Birth control/protection: Male Sterilization   Other Topics Concern    Not on file   Social History Narrative    Not on file     Social Determinants of Health     Financial Resource Strain: Not on file   Food Insecurity: Not on file   Transportation Needs: Not on file   Physical Activity: Not on file   Stress: Not on file   Social Connections: Not on file   Intimate Partner Violence: Not on file   Housing Stability: Not on file     No Known Allergies  Current Outpatient Medications on File Prior to Visit   Medication Sig Dispense Refill    alfuzosin (Uroxatral) 10 mg 24 hr tablet Take 1 tablet (10 mg) by mouth once daily. 90 tablet 3    [DISCONTINUED] allopurinol (Zyloprim) 300 mg tablet Take 1 tablet (300 mg) by mouth once daily. 90 tablet 3    [DISCONTINUED] finasteride (Proscar) 5 mg tablet Take 1 tablet (5 mg) by mouth once daily. 90 tablet 3    [DISCONTINUED] multivitamin tablet One-A-Day Men's 50+ Complete Multivitamin.      [DISCONTINUED] cholecalciferol (Vitamin D-3) 25 MCG (1000 UT) capsule Take 1 capsule (25 mcg) by mouth once daily. 30 capsule 11     No current facility-administered medications on file prior to visit.     Immunization History   Administered Date(s) Administered    Moderna COVID-19 vaccine, Fall 2023, 12 yeasrs and older (50mcg/0.5mL) 01/27/2024    Moderna SARS-CoV-2 Vaccination 03/25/2021, 04/22/2021, 12/02/2021    Pfizer COVID-19 vaccine, bivalent, age 12 years and older (30 mcg/0.3 mL) 09/30/2022    Zoster vaccine,  recombinant, adult (SHINGRIX) 03/02/2021, 06/02/2021     Patient's medical history was reviewed and updated either before or during this encounter.       Jorgito Sullivan MD

## 2024-04-18 LAB
TESTOSTERONE FREE (CHAN): 59.7 PG/ML (ref 35–155)
TESTOSTERONE,TOTAL,LC-MS/MS: 506 NG/DL (ref 250–1100)

## 2024-04-19 ENCOUNTER — HOSPITAL ENCOUNTER (OUTPATIENT)
Dept: RADIOLOGY | Facility: HOSPITAL | Age: 59
Discharge: HOME | End: 2024-04-19
Payer: COMMERCIAL

## 2024-04-19 DIAGNOSIS — E66.01 CLASS 3 SEVERE OBESITY WITHOUT SERIOUS COMORBIDITY WITH BODY MASS INDEX (BMI) OF 40.0 TO 44.9 IN ADULT, UNSPECIFIED OBESITY TYPE (MULTI): ICD-10-CM

## 2024-04-19 DIAGNOSIS — Z13.6 ENCOUNTER FOR SCREENING FOR CARDIOVASCULAR DISORDERS: ICD-10-CM

## 2024-04-19 DIAGNOSIS — E78.2 MIXED HYPERLIPIDEMIA: ICD-10-CM

## 2024-04-19 PROCEDURE — 75571 CT HRT W/O DYE W/CA TEST: CPT

## 2024-04-26 ENCOUNTER — OFFICE VISIT (OUTPATIENT)
Dept: SLEEP MEDICINE | Facility: CLINIC | Age: 59
End: 2024-04-26
Payer: COMMERCIAL

## 2024-04-26 VITALS
SYSTOLIC BLOOD PRESSURE: 169 MMHG | OXYGEN SATURATION: 95 % | HEART RATE: 96 BPM | BODY MASS INDEX: 40.26 KG/M2 | DIASTOLIC BLOOD PRESSURE: 115 MMHG | WEIGHT: 301 LBS

## 2024-04-26 DIAGNOSIS — R03.0 ELEVATED BLOOD PRESSURE READING: ICD-10-CM

## 2024-04-26 DIAGNOSIS — R60.0 BILATERAL LOWER EXTREMITY EDEMA: ICD-10-CM

## 2024-04-26 DIAGNOSIS — D36.10 NEUROMA: ICD-10-CM

## 2024-04-26 DIAGNOSIS — E66.01 MORBID OBESITY (MULTI): ICD-10-CM

## 2024-04-26 DIAGNOSIS — J34.2 NASAL SEPTAL DEVIATION: ICD-10-CM

## 2024-04-26 DIAGNOSIS — G47.30 SLEEP-DISORDERED BREATHING: Primary | ICD-10-CM

## 2024-04-26 PROCEDURE — 1036F TOBACCO NON-USER: CPT | Performed by: PSYCHIATRY & NEUROLOGY

## 2024-04-26 PROCEDURE — 3008F BODY MASS INDEX DOCD: CPT | Performed by: PSYCHIATRY & NEUROLOGY

## 2024-04-26 PROCEDURE — 99204 OFFICE O/P NEW MOD 45 MIN: CPT | Performed by: PSYCHIATRY & NEUROLOGY

## 2024-04-26 RX ORDER — CALCIUM CARBONATE 300MG(750)
400 TABLET,CHEWABLE ORAL DAILY
COMMUNITY

## 2024-04-26 RX ORDER — ACETAMINOPHEN 500 MG
TABLET ORAL
COMMUNITY
Start: 2023-09-16

## 2024-04-26 NOTE — PROGRESS NOTES
Patient: Quincy Villeda    [id number]  : 1965 -- AGE 58 y.o.    Provider: Jose Morgan MD     [facility]   Service Date: 2024              [facility]  New Visit Note      The patient's referring provider is: Jorgito Sullivan MD    HPI:  Quincy Villeda is a 58 y.o. male with PMH notable for hypertension, vitamin D deficiency, obesity, gout, neuroma, who presents today for evaluation of snoring and daytime fatigue.      Sleep disrupted for years - nocturia a few times per night sometimes with difficulty returning to sleep. Sleep is not usually restorative.    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    /99 mm Hg at home. No vision changes, headache, SOB, or chest pain now.    Lost 15 lbs in the last 5 weeks - intentionally.    NIGHTTIME SYMPTOMS:   Snoring: when he was  was told he snored, less when he weighed less  Witnessed apnea: unknown  Nocturnal gasping: No  Nocturnal choking: No  Sleep walking: No  Sleep talking:  No  Dream enactment: No  Bruxism: occasional  Nocturnal excessive sweating: No  Nocturnal GERD: No  Morning headaches: No  Morning dry mouth/sore throat: Frequent morning dry mouth, no sore throat - is a mouth-breather during sleep  Nocturia: Yes  Sleep paralysis: No  Hypnagogic/hypnopompic hallucinations: No  Bedroom environment is conducive to sleep: No    DAYTIME SYMPTOMS  Vivian:   Insomnia severity index:   Daytime sleepiness: generally none  Fatigue: mild  Trouble with memory/concentration: No  Irritability: no  Dozing: no  Feeling sleepy while driving: Denies    RLS symptoms: No   Cataplexy: No    SLEEP HABITS:  Preferred sleep position:  side  Bedtime: 9 pm, sleep latency - immediate  Wake time: 3 am on work days, 5 am on days off  # of nocturnal awakenings: 2 due to nocturia  Napping: on weekends - for 1 hour around 1 pm. Napping is  refreshing  Total estimated sleep per 24 hrs: 6.5 hours    PRIOR SLEEP STUDIES:  none    PRIOR TREATMENTS:  No stimulants or sleep aids.    Patient Active Problem List   Diagnosis    BPH (benign prostatic hyperplasia)    Mixed hearing loss, unilateral    Mixed hearing loss of left ear    Neuroma    Sensorineural hearing loss of right ear    Sensorineural hearing loss, unilateral    Vitamin D deficiency    History of gout    OA (osteoarthritis)    Paralysis of one side of face    Obesity    History of umbilical hernia repair    Elevated blood pressure reading    Bilateral lower extremity edema     Past Medical History:   Diagnosis Date    Headache 1980    History of umbilical hernia repair 08/2022    HL (hearing loss) 2010     Past Surgical History:   Procedure Laterality Date    HERNIA REPAIR  2022    MR GAMMA KNIFE TREATMENT PLANNING BRAIN MRI W OR WO CONTRAST      NASAL POLYP EXCISION      NASAL SEPTUM SURGERY      OTHER SURGICAL HISTORY  10/25/2013    Oral Surgery    OTHER SURGICAL HISTORY  11/11/2022    Umbilical hernia repair    ROTATOR CUFF REPAIR  10/25/2013    Rotator Cuff Repair    SINUS SURGERY  2014    VASECTOMY  1997    WISDOM TOOTH EXTRACTION  1992     Current Outpatient Medications   Medication Sig Dispense Refill    alfuzosin (Uroxatral) 10 mg 24 hr tablet Take 1 tablet (10 mg) by mouth once daily. 90 tablet 3    allopurinol (Zyloprim) 300 mg tablet Take 1 tablet (300 mg) by mouth once daily. 90 tablet 3    finasteride (Proscar) 5 mg tablet Take 1 tablet (5 mg) by mouth once daily. 90 tablet 3    multivitamin tablet One-A-Day Men's 50+ Complete Multivitamin.       No current facility-administered medications for this visit.     No Known Allergies    FAMILY HISTORY OF SLEEP DISORDERS:   Family History   Problem Relation Name Age of Onset    No Known Problems Mother      COPD Father Quincy Villeda Sr.     Diabetes Father Quincy Villeda Sr.     No Known Problems Sister         SOCIAL HISTORY  Employment:  "Manager  Lives with: , lives alone  Alcohol: Never  Cigarettes: Never  Illicits: No  Caffeine: 2 servings per day     ROS: 12 point ROS positive for nasal congestion, sinus problems. All other systems/items were reviewed and are negative.    PHYSICAL EXAMINATION:   Vitals:    04/26/24 1114   BP: (!) 169/115   BP Location: Left arm   Patient Position: Sitting   BP Cuff Size: Large adult   Pulse: 96   SpO2: 95%   Weight: 137 kg (301 lb)   Manual BP on repeat check just prior to 12 PM after the patient had been seated for over 30 minutes: 172/102 mm Hg  Body mass index is 40.26 kg/m².  General: Awake. Alert. Comfortable. No apparent distress.   Speech: Normal  Comprehension: Normal  Mood: Stable  Affect: Appropriate  Eyes:   Eyelids: normal            ENT:          nasal septum deviation to the right. Fajardo tongue position II. Tongue scalloping is not present, tongue is not enlarged, soft palate is not elongated, hard palate is not high arched. Uvula is not enlarged. Retrognathia is not present. Tonsils are absent. Dentition good overall.           Neck:          Circumference: 18.5\"  Cardiac: Regular in rate and rhythm. No murmurs. 2+ edema in RLE to mid shin, 1+ in RLE at the ankle  Pul:         Clear to auscultation bilaterally. Normal respiratory effort   Abd:         obese  Neuro: Alert, well-oriented. Marked left facial droop with mild weakness of left eyelid.  Moves all limbs symmetrically with no evidence of significant focal weakness. No abnormal movements noted. Normal gait        LABS/DIAGNOSTICS:  Lab Results   Component Value Date    HGB 14.8 04/12/2024    CO2 23 (L) 04/12/2024    TSH 1.10 04/12/2024    HGBA1C 5.4 04/12/2024    VITD25 21 (L) 04/12/2024        Echo: no  PFTs: no      ASSESSMENT AND PLAN: Mr. Quincy Villeda is a 58 y.o. male with a history of high blood pressure, obesity, nasal septum deviation s/p surgery (incomplete resolution of deviation), neuroma affecting his left CN-VII " and CN-IX, who has years of snoring, nocturia (at least partially due to his BPH, possibly also due to sleep apnea) and blood pressure that has been increasing over the months.       #sleep disordered breathing  -We discussed the risk factors for sleep apnea, pathophysiology of sleep apnea, treatment options, and potential long-term complications of untreated IRON, including cardiovascular and metabolic complications. We will start evaluation with a home sleep test. Plan for CPAP after diagnosing sleep apnea.    #elevated blood pressure - asymptomatic  -management per PCP  -may improve with treatment of sleep apnea (if he has sleep apnea)  -check sleep study for sleep apnea    #morbid obesity - recently lost 15 lbs  -encouraged continued weight loss efforts    #nasal septum deviation  -consider referral back to ENT if needed    #bilateral leg edema  -management per PCP    #neuroma - facial weakness may be causing him to mouth-breathe during sleep  -management per ENT/oncology/PCP    All of the above was discussed with the patient in detail. He voiced an understanding of the above and was agreeable to proceed further as advised. Procedure for the sleep study was discussed with him.    Around 45 minutes were spent on this encounter, including time reviewing the chart, conducting the H&P, counseling the patient, and documenting/placing orders.    FOLLOW UP:  After study to discuss results    CC: PCP (Jorgito Sullivan MD)

## 2024-05-13 ENCOUNTER — CLINICAL SUPPORT (OUTPATIENT)
Dept: PRIMARY CARE | Facility: CLINIC | Age: 59
End: 2024-05-13
Payer: COMMERCIAL

## 2024-05-13 VITALS — DIASTOLIC BLOOD PRESSURE: 90 MMHG | OXYGEN SATURATION: 99 % | HEART RATE: 85 BPM | SYSTOLIC BLOOD PRESSURE: 142 MMHG

## 2024-05-13 DIAGNOSIS — I10 PRIMARY HYPERTENSION: Primary | ICD-10-CM

## 2024-05-13 RX ORDER — AMLODIPINE BESYLATE 2.5 MG/1
2.5 TABLET ORAL DAILY
Qty: 30 TABLET | Refills: 1 | Status: SHIPPED | OUTPATIENT
Start: 2024-05-13 | End: 2024-05-28 | Stop reason: SDUPTHER

## 2024-05-13 ASSESSMENT — PAIN SCALES - GENERAL: PAINLEVEL: 0-NO PAIN

## 2024-05-17 ENCOUNTER — CLINICAL SUPPORT (OUTPATIENT)
Dept: SLEEP MEDICINE | Facility: CLINIC | Age: 59
End: 2024-05-17
Payer: COMMERCIAL

## 2024-05-17 DIAGNOSIS — G47.33 OBSTRUCTIVE SLEEP APNEA (ADULT) (PEDIATRIC): ICD-10-CM

## 2024-05-17 DIAGNOSIS — G47.30 SLEEP-DISORDERED BREATHING: ICD-10-CM

## 2024-05-17 DIAGNOSIS — R03.0 ELEVATED BLOOD PRESSURE READING: ICD-10-CM

## 2024-05-17 DIAGNOSIS — E66.01 MORBID OBESITY (MULTI): ICD-10-CM

## 2024-05-17 PROCEDURE — 95806 SLEEP STUDY UNATT&RESP EFFT: CPT | Performed by: PSYCHIATRY & NEUROLOGY

## 2024-05-17 NOTE — PROGRESS NOTES
Type of Study: HOME SLEEP STUDY - NOMAD     The patient received equipment and instructions for use of the [employer] [facility] Nomad HSAT device. The patient was instructed how to apply the effort belts, cannula, thermistor. It was also explained how the Nomad and oximeter components work.  The patient was asked to record their sleep for an 8-hour period.     The patient was informed of their responsibility for the device and acknowledged this by signing the HSAT device contract. The patient was asked to return the device on 5/20/2024 between the hours of 7525-9542 to the [facility].     The patient was instructed to call 911 as usual for any medical- emergencies while at home.  The patient was also given a phone number for troubleshooting when using the device in case there were additional questions.

## 2024-05-28 ENCOUNTER — CLINICAL SUPPORT (OUTPATIENT)
Dept: PRIMARY CARE | Facility: CLINIC | Age: 59
End: 2024-05-28
Payer: COMMERCIAL

## 2024-05-28 VITALS — DIASTOLIC BLOOD PRESSURE: 78 MMHG | SYSTOLIC BLOOD PRESSURE: 128 MMHG

## 2024-05-28 DIAGNOSIS — I10 PRIMARY HYPERTENSION: ICD-10-CM

## 2024-05-28 RX ORDER — AMLODIPINE BESYLATE 2.5 MG/1
2.5 TABLET ORAL DAILY
Qty: 90 TABLET | Refills: 3 | Status: SHIPPED | OUTPATIENT
Start: 2024-05-28

## 2024-05-28 ASSESSMENT — PAIN SCALES - GENERAL: PAINLEVEL: 0-NO PAIN

## 2024-05-28 NOTE — PROGRESS NOTES
MD notified. Educated pt on checking blood pressure once or twice a month. Continue medications at prescribed. Pt voiced understanding.

## 2024-06-04 NOTE — PROGRESS NOTES
Patient: Quincy Villeda    [id number]  : 1965 -- AGE 58 y.o.    Provider: Jose Morgan MD     [facility]   Service Date: 2024              [facility]  Follow-up Note      Virtual or Telephone Consent  An interactive audio and video telecommunication system which permits real time communications between the patient (at the originating site) and provider (at the distant site) was utilized to provide this telehealth service.   Verbal consent was requested and obtained from Quincy Villeda on this date, 24 for a telehealth visit.   I verified the patient's identity and physical location in [place].  If this is a new patient to me, I informed the patient of my name and type of active [place] license that I hold.          HPI: Quincy Villeda is a 58 y.o. male with PMH notable for hypertension, nasal septum deviation, vitamin D deficiency, obesity, bilateral leg edema, gout, and left facial nerve neuroma, who presented on 2024 with snoring, disrupted sleep, nocturia several times per night, non-restorative sleep, frequent morning dry mouth, and mild daytime fatigue, who underwent sleep testing with a home sleep study. He is here today for a follow up visit to review the results.     Home sleep study on 2024: weight 133.4 kg (BMI 39.9). Moderate IRON - REI3% 29.2, REI4% 19.3, LOWELL 0.1. Respiratory events were fewest when patient was in the right lateral position. Mean SpO2 91%, pablo 75%, <=88% for 37 minutes.    Report reviewed in detail by me.     Reviewed test results in detail with the patient. He slept fairly well that night.    Treatment options were reviewed. He is agreeable to starting autoCPAP at home.    Patient Active Problem List   Diagnosis    BPH (benign prostatic hyperplasia)    Mixed hearing loss, unilateral    Mixed hearing loss of left ear    Neuroma    Sensorineural hearing loss of right ear    Sensorineural hearing loss,  unilateral    Vitamin D deficiency    History of gout    OA (osteoarthritis)    Paralysis of one side of face    Obesity    History of umbilical hernia repair    Elevated blood pressure reading    Bilateral lower extremity edema    HTN (hypertension)     Past Medical History:   Diagnosis Date    Headache 1980    History of umbilical hernia repair 08/2022    HL (hearing loss) 2010     Past Surgical History:   Procedure Laterality Date    HERNIA REPAIR  2022    MR GAMMA KNIFE TREATMENT PLANNING BRAIN MRI W OR WO CONTRAST      NASAL POLYP EXCISION      NASAL SEPTUM SURGERY      OTHER SURGICAL HISTORY  10/25/2013    Oral Surgery    OTHER SURGICAL HISTORY  11/11/2022    Umbilical hernia repair    ROTATOR CUFF REPAIR  10/25/2013    Rotator Cuff Repair    SINUS SURGERY  2014    VASECTOMY  1997    WISDOM TOOTH EXTRACTION  1992     Current Outpatient Medications on File Prior to Visit   Medication Sig Dispense Refill    alfuzosin (Uroxatral) 10 mg 24 hr tablet Take 1 tablet (10 mg) by mouth once daily. 90 tablet 3    allopurinol (Zyloprim) 300 mg tablet Take 1 tablet (300 mg) by mouth once daily. 90 tablet 3    amLODIPine (Norvasc) 2.5 mg tablet Take 1 tablet (2.5 mg) by mouth once daily. 90 tablet 3    cholecalciferol (Vitamin D3) 5,000 Units tablet       finasteride (Proscar) 5 mg tablet Take 1 tablet (5 mg) by mouth once daily. 90 tablet 3    magnesium oxide (Mag-Ox) 400 mg tablet 1 tablet (400 mg) once daily.      multivitamin tablet One-A-Day Men's 50+ Complete Multivitamin.      POTASSIUM ORAL Take by mouth.       No current facility-administered medications on file prior to visit.       PHYSICAL EXAMINATION:   General: Awake. Alert. Comfortable. No apparent distress.   Speech: Normal.  Comprehension: Normal.  Mood: Stable.  Affect: Appropriate.  Pul:         Normal respiratory effort.   Neuro: Alert, well-oriented. Marked left facial droop with mild weakness of left eyelid.       ASSESSMENT AND PLAN: Mr. Quincy VILLAFUERTE  Christiana is a 58 y.o. male with at least moderate IRON found on home sleep testing. PMH notable for hypertension, nasal septum deviation, vitamin D deficiency, obesity, bilateral leg edema, gout, and left facial nerve neuroma. His sleep is disrupted by nocturia several times per night and he hopes treatment will help with this. Treatment is indicated due to his HTN and daytime fatigue.      #IRON  -We discussed treatment options  -we will start treatment with autoCPAP 4-20 cmH2O (DME: [facility]) and adjust the settings as needed. It is imperative to treat his IRON in an adequate fashion. He should try and use his machine every time he sleeps and for the maximum number of hours possible. Insurance compliance requirements were discussed. I will send [facility] message with CPAP setup instructions  -advised pt to sleep on his right side where his IRON seems to be less severe    All of the above was discussed with the patient in detail. He voiced an understanding of the above and was agreeable to proceed further as advised.     Around 25 minutes were spent with the patient plus time spent reviewing the chart, updating the chart as needed, and documenting.     FOLLOW UP: July 26 at 11:20 AM via video for CPAP follow up

## 2024-06-05 ENCOUNTER — OFFICE VISIT (OUTPATIENT)
Dept: SLEEP MEDICINE | Facility: CLINIC | Age: 59
End: 2024-06-05
Payer: COMMERCIAL

## 2024-06-05 DIAGNOSIS — G47.33 OSA (OBSTRUCTIVE SLEEP APNEA): Primary | ICD-10-CM

## 2024-06-05 PROCEDURE — 3008F BODY MASS INDEX DOCD: CPT | Performed by: PSYCHIATRY & NEUROLOGY

## 2024-06-05 PROCEDURE — 99213 OFFICE O/P EST LOW 20 MIN: CPT | Performed by: PSYCHIATRY & NEUROLOGY

## 2024-06-05 ASSESSMENT — PATIENT HEALTH QUESTIONNAIRE - PHQ9
SUM OF ALL RESPONSES TO PHQ9 QUESTIONS 1 AND 2: 0
2. FEELING DOWN, DEPRESSED OR HOPELESS: NOT AT ALL
1. LITTLE INTEREST OR PLEASURE IN DOING THINGS: NOT AT ALL

## 2024-07-19 ENCOUNTER — LAB (OUTPATIENT)
Dept: LAB | Facility: LAB | Age: 59
End: 2024-07-19
Payer: COMMERCIAL

## 2024-07-19 DIAGNOSIS — Z12.5 ENCOUNTER FOR SCREENING FOR MALIGNANT NEOPLASM OF PROSTATE: Primary | ICD-10-CM

## 2024-07-19 PROCEDURE — 36415 COLL VENOUS BLD VENIPUNCTURE: CPT

## 2024-07-19 PROCEDURE — 84153 ASSAY OF PSA TOTAL: CPT

## 2024-07-20 LAB — PSA SERPL-MCNC: 0.44 NG/ML

## 2024-07-26 ENCOUNTER — APPOINTMENT (OUTPATIENT)
Dept: SLEEP MEDICINE | Facility: CLINIC | Age: 59
End: 2024-07-26
Payer: COMMERCIAL

## 2024-07-26 DIAGNOSIS — R53.83 FATIGUE, UNSPECIFIED TYPE: ICD-10-CM

## 2024-07-26 DIAGNOSIS — G47.33 OSA (OBSTRUCTIVE SLEEP APNEA): Primary | ICD-10-CM

## 2024-07-26 PROCEDURE — 99213 OFFICE O/P EST LOW 20 MIN: CPT | Performed by: PSYCHIATRY & NEUROLOGY

## 2024-07-26 PROCEDURE — 1036F TOBACCO NON-USER: CPT | Performed by: PSYCHIATRY & NEUROLOGY

## 2024-07-26 NOTE — PROGRESS NOTES
" Patient: Quincy Villeda    [id number]  : 1965 -- AGE 58 y.o.    Provider: Jose Morgan MD     [facility]   Service Date: 2024              [facility]  Follow-up Note      Virtual or Telephone Consent  An interactive audio and video telecommunication system which permits real time communications between the patient (at the originating site) and provider (at the distant site) was utilized to provide this telehealth service.   Verbal consent was requested and obtained from Quincy Villeda on this date, 24 for a telehealth visit.   I verified the patient's identity and physical location in [place].  If this is a new patient to me, I informed the patient of my name and type of active [place] license that I hold.          HPI: Quincy Villeda is a 58 y.o. male with moderate IRON who was recently started on autoCPAP. He presented on 2024 with snoring, disrupted sleep, nocturia several times per night, non-restorative sleep, frequent morning dry mouth, and mild daytime fatigue.  PMH notable for hypertension, nasal septum deviation, vitamin D deficiency. He is here today for his first CPAP follow up visit.     Using CPAP is awkward. No issues falling asleep with it.    Does not feel any more awake in the daytime. Wakes up a few times in the night. Does not feel any more rested in the morning. Feels he sleeps well initially in the night for the first \"chunk\" of sleep, which is how he felt even prior to CPAP. Takes time to adjust to CPAP when he gets back to bed from using the restroom. No pressure intolerance.    Only benefit is that he no longer has morning dry mouth.    On weekends he feels sleepy after being awake for 1.5 hours and naps on the couch.    Willing to keep going with CPAP and hold the course and see how the next couple of months go before reassessing.    Sleeps on his side. Sometimes mask gets dislodged and mask leaks into his " eye.    Gets up early nowadays to exercise and goes to bed earlier as a result.    PAP DEVICE   DME: [employer] [facility]  Setup date: 24  Type: AirSense 11 autoCPAP  Settin-20 cm H2O  Finding benefit: as above    MASK  Type: hybrid FFM - F40, size medium  Fit: generally good    Prior Sleep studies:   Home sleep study on 2024: weight 133.4 kg (BMI 39.9). Moderate IRON - REI3% 29.2, REI4% 19.3, LOWELL 0.1. Respiratory events were fewest when patient was in the right lateral position. Mean SpO2 91%, pablo 75%, <=88% for 37 minutes.             SLEEP HABITS   Sleeps from 8:30 pm to 3:30 am during the week, sleeps in on weekends.        REVIEW OF MACHINE DOWNLOAD:   Date Range: 24-24  % Days used: 100%  % Days with Usage >= 4 hrs: 100%  Average usage days used: 6 h 2 min   Settin-20 cmH2O, EPR 3  95th percentile pressure: 11.4 cmH2O, median pressure 8.1 cmH2O, max pressure 13.0 cmH2O  95th percentile leak: 3.4 LPM  Residual AHI: 1.0      Patient Active Problem List   Diagnosis    BPH (benign prostatic hyperplasia)    Mixed hearing loss, unilateral    Mixed hearing loss of left ear    Neuroma    Sensorineural hearing loss of right ear    Sensorineural hearing loss, unilateral    Vitamin D deficiency    History of gout    OA (osteoarthritis)    Paralysis of one side of face    Obesity    History of umbilical hernia repair    Elevated blood pressure reading    Bilateral lower extremity edema    HTN (hypertension)    IRON (obstructive sleep apnea)     Past Medical History:   Diagnosis Date    Headache     History of umbilical hernia repair 2022    HL (hearing loss)      Past Surgical History:   Procedure Laterality Date    HERNIA REPAIR      MR GAMMA KNIFE TREATMENT PLANNING BRAIN MRI W OR WO CONTRAST      NASAL POLYP EXCISION      NASAL SEPTUM SURGERY      OTHER SURGICAL HISTORY  10/25/2013    Oral Surgery    OTHER SURGICAL HISTORY  2022    Umbilical hernia repair    ROTATOR CUFF REPAIR   10/25/2013    Rotator Cuff Repair    SINUS SURGERY  2014    VASECTOMY  1997    WISDOM TOOTH EXTRACTION  1992     Current Outpatient Medications on File Prior to Visit   Medication Sig Dispense Refill    alfuzosin (Uroxatral) 10 mg 24 hr tablet Take 1 tablet (10 mg) by mouth once daily. 90 tablet 3    allopurinol (Zyloprim) 300 mg tablet Take 1 tablet (300 mg) by mouth once daily. 90 tablet 3    amLODIPine (Norvasc) 2.5 mg tablet Take 1 tablet (2.5 mg) by mouth once daily. 90 tablet 3    cholecalciferol (Vitamin D3) 5,000 Units tablet       finasteride (Proscar) 5 mg tablet Take 1 tablet (5 mg) by mouth once daily. 90 tablet 3    magnesium oxide (Mag-Ox) 400 mg tablet 1 tablet (400 mg) once daily.      multivitamin tablet One-A-Day Men's 50+ Complete Multivitamin.      POTASSIUM ORAL Take by mouth.       No current facility-administered medications on file prior to visit.       PHYSICAL EXAMINATION:   General: Awake. Alert. Comfortable. No apparent distress.   Speech: Normal.  Comprehension: Normal.  Mood: Stable.  Affect: Appropriate.  Pul:         Normal respiratory effort.   Neuro: Alert, well-oriented.  Marked left facial droop with mild weakness of left eyelid.       ASSESSMENT AND PLAN: Mr. Quincy Villeda is a 58 y.o. male with at least moderate IRON found on home sleep testing and is now about 6 weeks into CPAP treatment. PMH notable for hypertension, nasal septum deviation, vitamin D deficiency, obesity, bilateral leg edema, gout, and left facial nerve neuroma. He is using his device nightly and the only benefit is that he no longer wakes up with a dry mouth. No improvement in sleep quality or daytime fatigue. Mask leak is generally low, AHI is very low averaging only 1 event of sleep apnea per hour, and compliance is excellent at 6 hours/night on average. Fatigue may be related to a combination of factors including years of untreated IRON, having to wake up at 3:30 AM for work, and vitamin D  deficiency.    #IRON  -continue current PAP settings  -pt to continue to use PAP nightly    #fatigue  -treat IRON as above  -consider in-lab CPAP titration study to find optimal pressure setting, though CPAP report shows excellent control at current range of pressures  -consider rechecking vit D levels if fatigue does not improve in the next couple of months with continued CPAP usage      20 minutes were spent with the patient plus time spent reviewing the chart, updating the chart as needed, and documenting.     FOLLOW UP: Sept 27 at 8:40 AM via video for CPAP follow up

## 2024-09-23 ENCOUNTER — APPOINTMENT (OUTPATIENT)
Dept: UROLOGY | Facility: CLINIC | Age: 59
End: 2024-09-23
Payer: COMMERCIAL

## 2024-09-23 DIAGNOSIS — R33.9 URINARY RETENTION: Primary | ICD-10-CM

## 2024-09-23 DIAGNOSIS — N40.0 BENIGN PROSTATIC HYPERPLASIA WITHOUT LOWER URINARY TRACT SYMPTOMS: ICD-10-CM

## 2024-09-23 DIAGNOSIS — R39.12 WEAK URINE STREAM: ICD-10-CM

## 2024-09-23 LAB
POC APPEARANCE, URINE: CLEAR
POC BILIRUBIN, URINE: NEGATIVE
POC BLOOD, URINE: NEGATIVE
POC COLOR, URINE: YELLOW
POC GLUCOSE, URINE: NEGATIVE MG/DL
POC KETONES, URINE: ABNORMAL MG/DL
POC LEUKOCYTES, URINE: NEGATIVE
POC NITRITE,URINE: NEGATIVE
POC PH, URINE: 6.5 PH
POC PROTEIN, URINE: NEGATIVE MG/DL
POC SPECIFIC GRAVITY, URINE: 1.01
POC UROBILINOGEN, URINE: 0.2 EU/DL

## 2024-09-23 PROCEDURE — G2211 COMPLEX E/M VISIT ADD ON: HCPCS | Performed by: UROLOGY

## 2024-09-23 PROCEDURE — 81003 URINALYSIS AUTO W/O SCOPE: CPT | Performed by: UROLOGY

## 2024-09-23 PROCEDURE — 99214 OFFICE O/P EST MOD 30 MIN: CPT | Performed by: UROLOGY

## 2024-09-23 RX ORDER — ALFUZOSIN HYDROCHLORIDE 10 MG/1
10 TABLET, EXTENDED RELEASE ORAL DAILY
Qty: 30 TABLET | Refills: 11 | Status: SHIPPED | OUTPATIENT
Start: 2024-09-23 | End: 2025-09-23

## 2024-09-23 RX ORDER — FINASTERIDE 5 MG/1
5 TABLET, FILM COATED ORAL DAILY
Qty: 30 TABLET | Refills: 11 | Status: SHIPPED | OUTPATIENT
Start: 2024-09-23 | End: 2025-09-23

## 2024-09-23 ASSESSMENT — PAIN SCALES - GENERAL: PAINLEVEL: 0-NO PAIN

## 2024-09-23 NOTE — PROGRESS NOTES
"  Patient is a 58 y.o. male presenting with BPH    SUBJECTIVE:  HPI   He has a history of BPH.  He is on finasteride and alfuzosin.  His PSA in 7/2024 was 0.44.  He has weakness of the urine steam and nocturia x1      No results found for: \"URINECULTURE\"     Past Medical History:   Diagnosis Date    Headache 1980    History of umbilical hernia repair 08/2022    HL (hearing loss) 2010      Past Surgical History:   Procedure Laterality Date    HERNIA REPAIR  2022    MR GAMMA KNIFE TREATMENT PLANNING BRAIN MRI W OR WO CONTRAST      NASAL POLYP EXCISION      NASAL SEPTUM SURGERY      OTHER SURGICAL HISTORY  10/25/2013    Oral Surgery    OTHER SURGICAL HISTORY  11/11/2022    Umbilical hernia repair    ROTATOR CUFF REPAIR  10/25/2013    Rotator Cuff Repair    SINUS SURGERY  2014    VASECTOMY  1997    WISDOM TOOTH EXTRACTION  1992      Family History   Problem Relation Name Age of Onset    No Known Problems Mother      COPD Father Quincy Villeda .     Diabetes Father Quincy Wellerdeandra Henao.     No Known Problems Sister        Social History     Socioeconomic History    Marital status:    Tobacco Use    Smoking status: Never    Smokeless tobacco: Never   Vaping Use    Vaping status: Never Used   Substance and Sexual Activity    Alcohol use: Never    Drug use: Never    Sexual activity: Not Currently     Partners: Female     Birth control/protection: Male Sterilization        Review of Systems   Constitutional: denies any unintentional weight loss or change in strength.  Integumentary: denies any rashes or pruritus.  Eyes: denies any double vision or eye pain.  Ear/Nose/Mouth/Throat: denies any nosebleeds or gum bleeds.  Cardiovascular: denies any chest pain or syncope.  Respiratory: denies hemoptysis.  Gastrointestinal: denies nausea or vomiting.  Musculoskeletal: denies muscle cramping or weakness.  Neurologic: denies convulsions or seizures.  Hematologic/Lymphatic: denies bleeding tendencies.  Endocrine: denies " "heat/cold intolerance.  All other systems have been reviewed and are negative unless otherwise noted in the HPI.    OBJECTIVE:  There were no vitals taken for this visit.  Physical Exam   Constitutional: No obvious distress.  Eyes: Non-injected conjunctiva, sclera clear, EOMI.  Ears/Nose/Mouth/Throat: No obvious drainage per ears or nose.  Cardiovascular: Extremities are warm and well perfused. No edema, cyanosis or pallor.  Respiratory: No audible wheezing/stridor; respirations do not appear labored.  Gastrointestinal: Abdomen soft, not distended.  Musculoskeletal: Normal ROM of extremities.  Skin: No obvious rashes or open sores.  Neurologic: Alert and oriented, CN 2-12 grossly intact.  Psychiatric: Answers questions appropriately with normal affect.  Hematologic/Lymphatic/Immunologic: No obvious bruises or sites of spontaneous bleeding.  Genitourinary: No CVA tenderness, bladder not palpable.     Labs:  Lab Results   Component Value Date    WBC 4.3 (L) 04/12/2024    HGB 14.8 04/12/2024    HCT 44.0 04/12/2024     04/12/2024    CHOL 174 04/12/2024    TRIG 68 04/12/2024    HDL 53.0 04/12/2024    ALT 25 04/12/2024    AST 26 04/12/2024     04/12/2024    K 4.3 04/12/2024     04/12/2024    CREATININE 1.10 04/12/2024    BUN 20 04/12/2024    CO2 23 (L) 04/12/2024    TSH 1.10 04/12/2024    PSA 0.44 07/19/2024    INR 0.9 10/12/2022    HGBA1C 5.4 04/12/2024     No results found for: \"KPSAT\", \"KPSAP\"  IMAGING:        PROCEDURES:    ASSESSMENT/PLAN:  Problem List Items Addressed This Visit    None     He has a history of BPH.  He is treated with alfuzosin.  He started finasteride in July 2023.  He is retaining with 170 ml in the bladder.    He is seen for prostate cancer screening.  His PSA was 0.44 in July 2024.     We discussed further evaluation with cystoscopy.    All questions were answered to the patient’s satisfaction.  Patient agrees with the plan and wishes to proceed.  Follow-up will be scheduled " appropriately.     Joe Mckeon MD

## 2024-09-27 ENCOUNTER — APPOINTMENT (OUTPATIENT)
Dept: SLEEP MEDICINE | Facility: CLINIC | Age: 59
End: 2024-09-27
Payer: COMMERCIAL

## 2024-09-27 DIAGNOSIS — Z72.821 INADEQUATE SLEEP HYGIENE: ICD-10-CM

## 2024-09-27 DIAGNOSIS — G47.33 OSA (OBSTRUCTIVE SLEEP APNEA): Primary | ICD-10-CM

## 2024-09-27 PROCEDURE — 99213 OFFICE O/P EST LOW 20 MIN: CPT | Performed by: PSYCHIATRY & NEUROLOGY

## 2024-09-27 NOTE — PROGRESS NOTES
Patient: Quincy Villeda    [id number]  : 1965 -- AGE 58 y.o.    Provider: Jose Morgan MD     [facility]   Service Date: 2024              [facility]  Follow-up Note      Virtual or Telephone Consent  An interactive audio and video telecommunication system which permits real time communications between the patient (at the originating site) and provider (at the distant site) was utilized to provide this telehealth service.   Verbal consent was requested and obtained from Quincy Villeda on this date, 24 for a telehealth visit.   I verified the patient's identity and physical location in [place].  If this is a new patient to me, I informed the patient of my name and type of active [place] license that I hold.          HPI: Quincy Villeda is a 58 y.o. male with moderate IRON on CPAP. He presented on 2024 with snoring, disrupted sleep, nocturia several times per night, non-restorative sleep, frequent morning dry mouth, and mild daytime fatigue.  PMH notable for hypertension, nasal septum deviation, vitamin D deficiency. He is here today for a follow up visit.     Reports that he now falls asleep quickly with CPAP. Goes to bed only when he is starting to nod off while watching TV. If he wakes up in the night he takes some time to return to sleep due to thinking about the next day and is thinking about how much more sleep he would get.     Mask fits well for the most part. No significant complaints. Pressures are comfortable overall.    No daytime sleepiness.     Exercises after working a 10 hour day.    Wants to look into a hearing device.    PAP DEVICE   DME: [employer] [facility]  Setup date: 24  Type: AirSense 11 autoCPAP  Finding benefit: yes  MASK  Type: F40 hybrid FFM, medium  Fit: good generally    Supplies are being renewed at appropriate intervals.     Prior Sleep studies:   Home sleep study on 2024: weight 133.4 kg (BMI 39.9). Moderate  IRON - REI3% 29.2, REI4% 19.3, LOWELL 0.1. Respiratory events were fewest when patient was in the right lateral position. Mean SpO2 91%, pablo 75%, <=88% for 37 minutes.                 REVIEW OF MACHINE DOWNLOAD:   Date Range: 24-24  % Days used: 100%  % Days with Usage >= 4 hrs: 93%  Average usage days used: 5 h 38 min   Settin-20 cmH2O, EPR 3  95th percentile pressure: 11.5 cmH2O, median pressure 8.5 cmH2O, max pressure 12.7 cmH2O  95th percentile leak: 6.6 LPM  Residual AHI: 0.8      Patient Active Problem List   Diagnosis    BPH (benign prostatic hyperplasia)    Mixed hearing loss, unilateral    Mixed hearing loss of left ear    Neuroma    Sensorineural hearing loss of right ear    Sensorineural hearing loss, unilateral    Vitamin D deficiency    History of gout    OA (osteoarthritis)    Paralysis of one side of face    Obesity    History of umbilical hernia repair    Elevated blood pressure reading    Bilateral lower extremity edema    HTN (hypertension)    IRON (obstructive sleep apnea)     Past Medical History:   Diagnosis Date    Headache     History of umbilical hernia repair 2022    HL (hearing loss)      Past Surgical History:   Procedure Laterality Date    HERNIA REPAIR      MR GAMMA KNIFE TREATMENT PLANNING BRAIN MRI W OR WO CONTRAST      NASAL POLYP EXCISION      NASAL SEPTUM SURGERY      OTHER SURGICAL HISTORY  10/25/2013    Oral Surgery    OTHER SURGICAL HISTORY  2022    Umbilical hernia repair    ROTATOR CUFF REPAIR  10/25/2013    Rotator Cuff Repair    SINUS SURGERY  2014    VASECTOMY  1997    WISDOM TOOTH EXTRACTION  1992     Current Outpatient Medications on File Prior to Visit   Medication Sig Dispense Refill    alfuzosin (Uroxatral) 10 mg 24 hr tablet Take 1 tablet (10 mg) by mouth once daily. 30 tablet 11    allopurinol (Zyloprim) 300 mg tablet Take 1 tablet (300 mg) by mouth once daily. 90 tablet 3    amLODIPine (Norvasc) 2.5 mg tablet Take 1 tablet (2.5 mg) by  mouth once daily. 90 tablet 3    cholecalciferol (Vitamin D3) 5,000 Units tablet       finasteride (Proscar) 5 mg tablet Take 1 tablet (5 mg) by mouth once daily. 30 tablet 11    magnesium oxide (Mag-Ox) 400 mg tablet 1 tablet (400 mg) once daily.      multivitamin tablet One-A-Day Men's 50+ Complete Multivitamin.      POTASSIUM ORAL Take by mouth.      [DISCONTINUED] alfuzosin (Uroxatral) 10 mg 24 hr tablet Take 1 tablet (10 mg) by mouth once daily. 90 tablet 3    [DISCONTINUED] finasteride (Proscar) 5 mg tablet Take 1 tablet (5 mg) by mouth once daily. 90 tablet 3     No current facility-administered medications on file prior to visit.       PHYSICAL EXAMINATION:   General: Awake. Alert. Comfortable. No apparent distress.   Speech: Normal.  Comprehension: Normal.  Mood: Stable.  Affect: Appropriate.  Pul:         Normal respiratory effort.   Neuro: Alert, well-oriented. Marked left facial droop with mild weakness of left eyelid.       ASSESSMENT AND PLAN: Mr. Quincy Villeda is a 58 y.o. male with at least moderate IRON doing very well now with CPAP. Occasionally he over-thinks when he wakes up in the middle of the night, so he was advised to not clock-watch during the night and to not use his bedroom as the place for over-thinking. Patient's sleep apnea is under very good control with CPAP, he is deriving significant subjective benefit from treatment, his compliance is excellent, and mask leak is well controlled overall.      #IRON  -continue current CPAP settings  -praised pt's success with CPAP  -continue nightly usage of CPAP    #inadequate sleep hygiene  -stimulus control reviewed as described above    All of the above was discussed with the patient in detail. He voiced an understanding of the above and was agreeable to proceed further as advised.     20 minutes were spent with the patient plus time spent reviewing the chart, updating the chart as needed, and documenting.     FOLLOW UP: March 28 at 8 AM via  video for 6 month CPAP followup

## 2024-09-29 PROBLEM — R33.9 URINARY RETENTION: Status: ACTIVE | Noted: 2024-09-29

## 2024-09-29 PROBLEM — R39.12 WEAK URINE STREAM: Status: ACTIVE | Noted: 2024-09-29

## 2024-10-17 ENCOUNTER — APPOINTMENT (OUTPATIENT)
Dept: UROLOGY | Facility: CLINIC | Age: 59
End: 2024-10-17
Payer: COMMERCIAL

## 2024-10-17 VITALS — HEIGHT: 74 IN | TEMPERATURE: 97.8 F | WEIGHT: 295.4 LBS | BODY MASS INDEX: 37.91 KG/M2

## 2024-10-17 DIAGNOSIS — N39.0 URINARY TRACT INFECTION WITHOUT HEMATURIA, SITE UNSPECIFIED: ICD-10-CM

## 2024-10-17 DIAGNOSIS — N40.0 BENIGN PROSTATIC HYPERPLASIA WITHOUT LOWER URINARY TRACT SYMPTOMS: ICD-10-CM

## 2024-10-17 DIAGNOSIS — R39.12 WEAK URINE STREAM: ICD-10-CM

## 2024-10-17 DIAGNOSIS — R33.9 URINARY RETENTION: Primary | ICD-10-CM

## 2024-10-17 LAB
POC APPEARANCE, URINE: CLEAR
POC BILIRUBIN, URINE: NEGATIVE
POC BLOOD, URINE: ABNORMAL
POC COLOR, URINE: YELLOW
POC GLUCOSE, URINE: NEGATIVE MG/DL
POC KETONES, URINE: ABNORMAL MG/DL
POC LEUKOCYTES, URINE: NEGATIVE
POC NITRITE,URINE: NEGATIVE
POC PH, URINE: 5.5 PH
POC PROTEIN, URINE: NEGATIVE MG/DL
POC SPECIFIC GRAVITY, URINE: 1.02
POC UROBILINOGEN, URINE: 0.2 EU/DL

## 2024-10-17 PROCEDURE — G2211 COMPLEX E/M VISIT ADD ON: HCPCS | Performed by: UROLOGY

## 2024-10-17 PROCEDURE — 52000 CYSTOURETHROSCOPY: CPT | Performed by: UROLOGY

## 2024-10-17 PROCEDURE — 81003 URINALYSIS AUTO W/O SCOPE: CPT | Performed by: UROLOGY

## 2024-10-17 PROCEDURE — 76872 US TRANSRECTAL: CPT | Performed by: UROLOGY

## 2024-10-17 PROCEDURE — 99213 OFFICE O/P EST LOW 20 MIN: CPT | Performed by: UROLOGY

## 2024-10-17 RX ORDER — CEPHALEXIN 500 MG/1
500 CAPSULE ORAL 2 TIMES DAILY
Qty: 2 CAPSULE | Refills: 0 | Status: SHIPPED | OUTPATIENT
Start: 2024-10-17 | End: 2024-10-18

## 2024-10-17 ASSESSMENT — PAIN SCALES - GENERAL: PAINLEVEL_OUTOF10: 0-NO PAIN

## 2024-10-17 NOTE — PROGRESS NOTES
"  Patient is a 58 y.o. male presenting with BPH    SUBJECTIVE:  Benign Prostatic Hypertrophy     He has a history of BPH.  He is on finasteride and alfuzosin.  His PSA in 7/2024 was 0.44.  He has weakness of the urine steam and nocturia x1      No results found for: \"URINECULTURE\"     Past Medical History:   Diagnosis Date    Headache 1980    History of umbilical hernia repair 08/2022    HL (hearing loss) 2010      Past Surgical History:   Procedure Laterality Date    HERNIA REPAIR  2022    MR GAMMA KNIFE TREATMENT PLANNING BRAIN MRI W OR WO CONTRAST      NASAL POLYP EXCISION      NASAL SEPTUM SURGERY      OTHER SURGICAL HISTORY  10/25/2013    Oral Surgery    OTHER SURGICAL HISTORY  11/11/2022    Umbilical hernia repair    ROTATOR CUFF REPAIR  10/25/2013    Rotator Cuff Repair    SINUS SURGERY  2014    VASECTOMY  1997    WISDOM TOOTH EXTRACTION  1992      Family History   Problem Relation Name Age of Onset    No Known Problems Mother      COPD Father Quincy Wellerdeandra Henao.     Diabetes Father Quincy Raiannette Henao.     No Known Problems Sister        Social History     Socioeconomic History    Marital status:    Tobacco Use    Smoking status: Never    Smokeless tobacco: Never   Vaping Use    Vaping status: Never Used   Substance and Sexual Activity    Alcohol use: Never    Drug use: Never    Sexual activity: Not Currently     Partners: Female     Birth control/protection: Male Sterilization        Review of Systems   Constitutional: denies any unintentional weight loss or change in strength.  Integumentary: denies any rashes or pruritus.  Eyes: denies any double vision or eye pain.  Ear/Nose/Mouth/Throat: denies any nosebleeds or gum bleeds.  Cardiovascular: denies any chest pain or syncope.  Respiratory: denies hemoptysis.  Gastrointestinal: denies nausea or vomiting.  Musculoskeletal: denies muscle cramping or weakness.  Neurologic: denies convulsions or seizures.  Hematologic/Lymphatic: denies bleeding " "tendencies.  Endocrine: denies heat/cold intolerance.  All other systems have been reviewed and are negative unless otherwise noted in the HPI.    OBJECTIVE:  Visit Vitals  Temp 36.6 °C (97.8 °F)     Physical Exam   Constitutional: No obvious distress.  Eyes: Non-injected conjunctiva, sclera clear, EOMI.  Ears/Nose/Mouth/Throat: No obvious drainage per ears or nose.  Cardiovascular: Extremities are warm and well perfused. No edema, cyanosis or pallor.  Respiratory: No audible wheezing/stridor; respirations do not appear labored.  Gastrointestinal: Abdomen soft, not distended.  Musculoskeletal: Normal ROM of extremities.  Skin: No obvious rashes or open sores.  Neurologic: Alert and oriented, CN 2-12 grossly intact.  Psychiatric: Answers questions appropriately with normal affect.  Hematologic/Lymphatic/Immunologic: No obvious bruises or sites of spontaneous bleeding.  Genitourinary: No CVA tenderness, bladder not palpable.     Labs:  Lab Results   Component Value Date    WBC 4.3 (L) 04/12/2024    HGB 14.8 04/12/2024    HCT 44.0 04/12/2024     04/12/2024    CHOL 174 04/12/2024    TRIG 68 04/12/2024    HDL 53.0 04/12/2024    ALT 25 04/12/2024    AST 26 04/12/2024     04/12/2024    K 4.3 04/12/2024     04/12/2024    CREATININE 1.10 04/12/2024    BUN 20 04/12/2024    CO2 23 (L) 04/12/2024    TSH 1.10 04/12/2024    PSA 0.44 07/19/2024    INR 0.9 10/12/2022    HGBA1C 5.4 04/12/2024     No results found for: \"KPSAT\", \"KPSAP\"  IMAGING:        PROCEDURES:Cystoscopy    Date/Time: 10/17/2024 2:14 PM    Performed by: Joe Mckeon MD  Authorized by: Joe Mckeon MD      Comments:      No bladder tumors or stones. Large median lobe and lateral lobes. No urethral lesions    Prostate Ultrasound    Date/Time: 10/17/2024 2:17 PM    Performed by: Joe Mckeon MD  Authorized by: Joe Mckeon MD    Procedure: transrectal ultrasound      Additional Details      Prostate 30.27 mm in height 49.44 mm " in width and 60.72 mm in length for volume of 47.52 cm³          ASSESSMENT/PLAN:  Problem List Items Addressed This Visit    None     He has a history of BPH.  He is treated with alfuzosin.  He started finasteride in July 2023.  He is retaining 170 ml in the bladder.    We discussed cystoscopy results, and treatment and laser vaporization was recommended. Risks were reviewed.  He elected to follow up in 3 months, and will contact the office if he would like to be scheduled for the procedure.    He is seen for prostate cancer screening.  His PSA was 0.44 in July 2024.     All questions were answered to the patient’s satisfaction.  Patient agrees with the plan and wishes to proceed.  Follow-up will be scheduled appropriately.     Joe Mckeon MD

## 2025-01-18 NOTE — PROGRESS NOTES
"  Patient is a 59 y.o. male presenting virtually for BPH follow up    SUBJECTIVE:  HPI   He reports nocturia 1x. He does not have any issues with daytime frequency. Continues to utilize Finasteride and Uroxatral.    He does have some urinary retention.     He has some concerns regarding swelling of one of his testicles.           OBJECTIVE:  There were no vitals taken for this visit.      Current Outpatient Medications   Medication Instructions    alfuzosin (UROXATRAL) 10 mg, oral, Daily    allopurinol (ZYLOPRIM) 300 mg, oral, Daily    amLODIPine (NORVASC) 2.5 mg, oral, Daily    cholecalciferol (Vitamin D3) 5,000 Units tablet     finasteride (PROSCAR) 5 mg, oral, Daily    magnesium oxide (MAG-OX) 400 mg, Daily    multivitamin tablet One-A-Day Men's 50+ Complete Multivitamin.    POTASSIUM ORAL oral     No Known Allergies    Labs:  Testosterone   Date Value Ref Range Status   01/12/2024 382 240 - 1,000 ng/dL Final     Prostate Specific AG (ng/mL)   Date Value   07/19/2024 0.44   04/12/2024 0.30     PSA (NG/ML)   Date Value   09/29/2023 0.4   07/14/2023 0.6   07/08/2022 0.7      No results found for: \"URINECULTURE\"   IMAGING:        PROCEDURES:    ASSESSMENT/PLAN:  Problem List Items Addressed This Visit    None     He has a history of BPH.  He is treated with alfuzosin.  He started finasteride in July 2023.  He is retaining 170 ml in the bladder (10/2024).    We discussed cystoscopy results, and treatment and laser vaporization was recommended. Risks were reviewed.  He will contact the office if he would like to be scheduled for the procedure.    He is seen for prostate cancer screening.  His PSA was 0.44 in July 2024. He is due for re-screening July 2025, this was ordered.     Scrotal ultrasound ordered for possible hydrocele.     We will see him back in office July 2025.    All questions were answered to the patient’s satisfaction.  Patient agrees with the plan and wishes to proceed.  Follow-up will be scheduled " appropriately.     Scribe Attestation  By signing my name below, I, Chevy Kong,   attest that this documentation has been prepared under the direction and in the presence of Joe Mckeon MD.    Scribe Attestation  By signing my name below, I, Chevy Trevino   attest that this documentation has been prepared under the direction and in the presence of Joe Mckeon MD.

## 2025-01-20 ENCOUNTER — APPOINTMENT (OUTPATIENT)
Dept: UROLOGY | Facility: CLINIC | Age: 60
End: 2025-01-20
Payer: COMMERCIAL

## 2025-01-20 DIAGNOSIS — R39.12 BENIGN PROSTATIC HYPERPLASIA WITH WEAK URINARY STREAM: Primary | ICD-10-CM

## 2025-01-20 DIAGNOSIS — N43.2 OTHER HYDROCELE: ICD-10-CM

## 2025-01-20 DIAGNOSIS — Z12.5 PROSTATE CANCER SCREENING: ICD-10-CM

## 2025-01-20 DIAGNOSIS — N43.3 HYDROCELE IN ADULT: ICD-10-CM

## 2025-01-20 DIAGNOSIS — N40.1 BENIGN PROSTATIC HYPERPLASIA WITH WEAK URINARY STREAM: Primary | ICD-10-CM

## 2025-01-20 PROCEDURE — 99214 OFFICE O/P EST MOD 30 MIN: CPT | Performed by: UROLOGY

## 2025-01-20 PROCEDURE — G2211 COMPLEX E/M VISIT ADD ON: HCPCS | Performed by: UROLOGY

## 2025-01-24 ENCOUNTER — HOSPITAL ENCOUNTER (OUTPATIENT)
Dept: RADIOLOGY | Facility: HOSPITAL | Age: 60
Discharge: HOME | End: 2025-01-24
Payer: COMMERCIAL

## 2025-01-24 DIAGNOSIS — N43.3 HYDROCELE IN ADULT: ICD-10-CM

## 2025-01-24 PROCEDURE — 76870 US EXAM SCROTUM: CPT

## 2025-02-11 ENCOUNTER — TELEPHONE (OUTPATIENT)
Dept: UROLOGY | Facility: CLINIC | Age: 60
End: 2025-02-11
Payer: COMMERCIAL

## 2025-02-11 NOTE — TELEPHONE ENCOUNTER
Attempted to contact patient regarding rescheduling his FUV on 07/21/25 due to provider not being available that day. Left office phone number and business hours. Asked patient to call us back to reschedule.

## 2025-03-28 ENCOUNTER — APPOINTMENT (OUTPATIENT)
Dept: SLEEP MEDICINE | Facility: CLINIC | Age: 60
End: 2025-03-28
Payer: COMMERCIAL

## 2025-04-18 ENCOUNTER — APPOINTMENT (OUTPATIENT)
Dept: PRIMARY CARE | Facility: CLINIC | Age: 60
End: 2025-04-18
Payer: COMMERCIAL

## 2025-04-18 DIAGNOSIS — Z87.39 HISTORY OF GOUT: ICD-10-CM

## 2025-04-18 RX ORDER — ALLOPURINOL 300 MG/1
300 TABLET ORAL DAILY
Qty: 30 TABLET | Refills: 11 | Status: SHIPPED | OUTPATIENT
Start: 2025-04-18

## 2025-05-22 DIAGNOSIS — I10 PRIMARY HYPERTENSION: ICD-10-CM

## 2025-05-23 RX ORDER — AMLODIPINE BESYLATE 2.5 MG/1
2.5 TABLET ORAL DAILY
Qty: 90 TABLET | Refills: 3 | Status: SHIPPED | OUTPATIENT
Start: 2025-05-23

## 2025-06-27 LAB
BASOPHILS # BLD AUTO: 39 CELLS/UL (ref 0–200)
BASOPHILS NFR BLD AUTO: 0.8 %
EOSINOPHIL # BLD AUTO: 152 CELLS/UL (ref 15–500)
EOSINOPHIL NFR BLD AUTO: 3.1 %
ERYTHROCYTE [DISTWIDTH] IN BLOOD BY AUTOMATED COUNT: 14.3 % (ref 11–15)
HCT VFR BLD AUTO: 44.8 % (ref 38.5–50)
HGB BLD-MCNC: 15 G/DL (ref 13.2–17.1)
LYMPHOCYTES # BLD AUTO: 1867 CELLS/UL (ref 850–3900)
LYMPHOCYTES NFR BLD AUTO: 38.1 %
MCH RBC QN AUTO: 30.4 PG (ref 27–33)
MCHC RBC AUTO-ENTMCNC: 33.5 G/DL (ref 32–36)
MCV RBC AUTO: 90.9 FL (ref 80–100)
MONOCYTES # BLD AUTO: 421 CELLS/UL (ref 200–950)
MONOCYTES NFR BLD AUTO: 8.6 %
NEUTROPHILS # BLD AUTO: 2421 CELLS/UL (ref 1500–7800)
NEUTROPHILS NFR BLD AUTO: 49.4 %
PLATELET # BLD AUTO: 189 THOUSAND/UL (ref 140–400)
PMV BLD REES-ECKER: 10.9 FL (ref 7.5–12.5)
RBC # BLD AUTO: 4.93 MILLION/UL (ref 4.2–5.8)
WBC # BLD AUTO: 4.9 THOUSAND/UL (ref 3.8–10.8)

## 2025-07-02 ASSESSMENT — PROMIS GLOBAL HEALTH SCALE
RATE_SOCIAL_SATISFACTION: GOOD
RATE_GENERAL_HEALTH: GOOD
CARRYOUT_PHYSICAL_ACTIVITIES: COMPLETELY
RATE_MENTAL_HEALTH: GOOD
RATE_QUALITY_OF_LIFE: GOOD
RATE_AVERAGE_FATIGUE: MILD
EMOTIONAL_PROBLEMS: RARELY
RATE_AVERAGE_PAIN: 1
CARRYOUT_SOCIAL_ACTIVITIES: GOOD
RATE_PHYSICAL_HEALTH: GOOD

## 2025-07-03 ENCOUNTER — OFFICE VISIT (OUTPATIENT)
Dept: PRIMARY CARE | Facility: CLINIC | Age: 60
End: 2025-07-03
Payer: COMMERCIAL

## 2025-07-03 VITALS
HEIGHT: 74 IN | HEART RATE: 74 BPM | DIASTOLIC BLOOD PRESSURE: 80 MMHG | WEIGHT: 310 LBS | SYSTOLIC BLOOD PRESSURE: 126 MMHG | BODY MASS INDEX: 39.78 KG/M2 | OXYGEN SATURATION: 95 % | TEMPERATURE: 97.2 F

## 2025-07-03 DIAGNOSIS — Z23 ENCOUNTER FOR IMMUNIZATION: ICD-10-CM

## 2025-07-03 DIAGNOSIS — E78.2 MIXED HYPERLIPIDEMIA: ICD-10-CM

## 2025-07-03 DIAGNOSIS — N40.1 BENIGN PROSTATIC HYPERPLASIA WITH WEAK URINARY STREAM: ICD-10-CM

## 2025-07-03 DIAGNOSIS — Z12.11 ENCOUNTER FOR COLORECTAL CANCER SCREENING: ICD-10-CM

## 2025-07-03 DIAGNOSIS — Z00.00 ANNUAL PHYSICAL EXAM: Primary | ICD-10-CM

## 2025-07-03 DIAGNOSIS — Z12.12 ENCOUNTER FOR COLORECTAL CANCER SCREENING: ICD-10-CM

## 2025-07-03 DIAGNOSIS — Z12.5 ENCOUNTER FOR PROSTATE CANCER SCREENING: ICD-10-CM

## 2025-07-03 DIAGNOSIS — Z87.39 HISTORY OF GOUT: ICD-10-CM

## 2025-07-03 DIAGNOSIS — R39.12 BENIGN PROSTATIC HYPERPLASIA WITH WEAK URINARY STREAM: ICD-10-CM

## 2025-07-03 DIAGNOSIS — M15.0 PRIMARY OSTEOARTHRITIS INVOLVING MULTIPLE JOINTS: ICD-10-CM

## 2025-07-03 DIAGNOSIS — Z01.89 ENCOUNTER FOR ROUTINE LABORATORY TESTING: ICD-10-CM

## 2025-07-03 DIAGNOSIS — E55.9 VITAMIN D DEFICIENCY: ICD-10-CM

## 2025-07-03 DIAGNOSIS — E66.813 CLASS 3 SEVERE OBESITY WITHOUT SERIOUS COMORBIDITY WITH BODY MASS INDEX (BMI) OF 40.0 TO 44.9 IN ADULT, UNSPECIFIED OBESITY TYPE: ICD-10-CM

## 2025-07-03 DIAGNOSIS — I10 PRIMARY HYPERTENSION: ICD-10-CM

## 2025-07-03 DIAGNOSIS — G47.33 OSA (OBSTRUCTIVE SLEEP APNEA): ICD-10-CM

## 2025-07-03 DIAGNOSIS — R73.9 HYPERGLYCEMIA: ICD-10-CM

## 2025-07-03 PROBLEM — R33.9 URINARY RETENTION: Status: RESOLVED | Noted: 2024-09-29 | Resolved: 2025-07-03

## 2025-07-03 PROBLEM — R03.0 ELEVATED BLOOD PRESSURE READING: Status: RESOLVED | Noted: 2024-04-12 | Resolved: 2025-07-03

## 2025-07-03 PROCEDURE — 1036F TOBACCO NON-USER: CPT | Performed by: STUDENT IN AN ORGANIZED HEALTH CARE EDUCATION/TRAINING PROGRAM

## 2025-07-03 PROCEDURE — 3074F SYST BP LT 130 MM HG: CPT | Performed by: STUDENT IN AN ORGANIZED HEALTH CARE EDUCATION/TRAINING PROGRAM

## 2025-07-03 PROCEDURE — 3079F DIAST BP 80-89 MM HG: CPT | Performed by: STUDENT IN AN ORGANIZED HEALTH CARE EDUCATION/TRAINING PROGRAM

## 2025-07-03 PROCEDURE — 99396 PREV VISIT EST AGE 40-64: CPT | Performed by: STUDENT IN AN ORGANIZED HEALTH CARE EDUCATION/TRAINING PROGRAM

## 2025-07-03 PROCEDURE — 3008F BODY MASS INDEX DOCD: CPT | Performed by: STUDENT IN AN ORGANIZED HEALTH CARE EDUCATION/TRAINING PROGRAM

## 2025-07-03 ASSESSMENT — ENCOUNTER SYMPTOMS
NEUROLOGICAL NEGATIVE: 1
EYES NEGATIVE: 1
CONSTITUTIONAL NEGATIVE: 1
HEMATOLOGIC/LYMPHATIC NEGATIVE: 1
GASTROINTESTINAL NEGATIVE: 1
CARDIOVASCULAR NEGATIVE: 1
ENDOCRINE NEGATIVE: 1
MUSCULOSKELETAL NEGATIVE: 1
RESPIRATORY NEGATIVE: 1
ALLERGIC/IMMUNOLOGIC NEGATIVE: 1
PSYCHIATRIC NEGATIVE: 1

## 2025-07-03 ASSESSMENT — PAIN SCALES - GENERAL: PAINLEVEL_OUTOF10: 0-NO PAIN

## 2025-07-03 NOTE — PROGRESS NOTES
[facility]: [facility]  PHYSICAL EXAM      Quincy Villeda is a 59 y.o. male that is presenting today for Annual Exam.    Assessment/Plan   - Overall, the patient feels well and denies any acute symptoms or concerns at this time.  - Blood pressure at goal today.  - Encouraged continued dietary, exercise, and lifestyle modification.  - Encouraged continued use of CPAP.  - Significant medication and problem list reconciliation done today.     Discussed routine and/or preventative care with the patient as outlined below:  - Labwork:   - Patient appears to be due for labwork. Ordered today. I had ordered labwork; however, it appears that most of it did not get done despite the patient going to the lab.  - Will order labwork for the patient's next appointment. Encouraged the patient to get this labwork done one week prior to the next appointment.  - Imaging:   - Colorectal Cancer: Patient will be due for this until 2026.  - Immunizations:   - Patient does not appear to be due for routine immunizations at this time.     Diagnoses and all orders for this visit:  Annual physical exam  -     Follow Up In Primary Care  Encounter for colorectal cancer screening  Encounter for routine laboratory testing  Mixed hyperlipidemia  -     Comprehensive Metabolic Panel; Future  -     Lipid Panel; Future  -     Hemoglobin A1C; Future  -     TSH with reflex to Free T4 if abnormal; Future  -     Vitamin D 25-Hydroxy,Total (for eval of Vitamin D levels); Future  -     Testosterone,Free and Total; Future  -     Uric Acid; Future  -     CBC and Auto Differential; Future  -     Comprehensive Metabolic Panel; Future  -     Lipid Panel; Future  -     Hemoglobin A1C; Future  -     TSH with reflex to Free T4 if abnormal; Future  -     Vitamin D 25-Hydroxy,Total (for eval of Vitamin D levels); Future  -     Uric Acid; Future  Hyperglycemia  -     Comprehensive Metabolic Panel; Future  -     Lipid Panel; Future  -      Hemoglobin A1C; Future  -     TSH with reflex to Free T4 if abnormal; Future  -     Vitamin D 25-Hydroxy,Total (for eval of Vitamin D levels); Future  -     Testosterone,Free and Total; Future  -     Uric Acid; Future  -     CBC and Auto Differential; Future  -     Comprehensive Metabolic Panel; Future  -     Lipid Panel; Future  -     Hemoglobin A1C; Future  -     TSH with reflex to Free T4 if abnormal; Future  -     Vitamin D 25-Hydroxy,Total (for eval of Vitamin D levels); Future  -     Uric Acid; Future  Encounter for prostate cancer screening  -     Prostate Specific Antigen; Future  -     Prostate Specific Antigen, Screen; Future  Encounter for immunization  Class 3 severe obesity without serious comorbidity with body mass index (BMI) of 40.0 to 44.9 in adult, unspecified obesity type  Benign prostatic hyperplasia with weak urinary stream  -     Comprehensive Metabolic Panel; Future  -     Lipid Panel; Future  -     Hemoglobin A1C; Future  -     TSH with reflex to Free T4 if abnormal; Future  -     Vitamin D 25-Hydroxy,Total (for eval of Vitamin D levels); Future  -     Testosterone,Free and Total; Future  -     Uric Acid; Future  -     CBC and Auto Differential; Future  -     Comprehensive Metabolic Panel; Future  -     Lipid Panel; Future  -     Hemoglobin A1C; Future  -     TSH with reflex to Free T4 if abnormal; Future  -     Vitamin D 25-Hydroxy,Total (for eval of Vitamin D levels); Future  -     Uric Acid; Future  Primary osteoarthritis involving multiple joints  -     Comprehensive Metabolic Panel; Future  -     Lipid Panel; Future  -     Hemoglobin A1C; Future  -     TSH with reflex to Free T4 if abnormal; Future  -     Vitamin D 25-Hydroxy,Total (for eval of Vitamin D levels); Future  -     Testosterone,Free and Total; Future  -     Uric Acid; Future  -     CBC and Auto Differential; Future  -     Comprehensive Metabolic Panel; Future  -     Lipid Panel; Future  -     Hemoglobin A1C; Future  -     TSH  with reflex to Free T4 if abnormal; Future  -     Vitamin D 25-Hydroxy,Total (for eval of Vitamin D levels); Future  -     Uric Acid; Future  IRON (obstructive sleep apnea)  -     CBC and Auto Differential; Future  -     Comprehensive Metabolic Panel; Future  -     Lipid Panel; Future  -     Hemoglobin A1C; Future  -     TSH with reflex to Free T4 if abnormal; Future  -     Vitamin D 25-Hydroxy,Total (for eval of Vitamin D levels); Future  -     Uric Acid; Future  Primary hypertension  -     Comprehensive Metabolic Panel; Future  -     Lipid Panel; Future  -     Hemoglobin A1C; Future  -     TSH with reflex to Free T4 if abnormal; Future  -     Vitamin D 25-Hydroxy,Total (for eval of Vitamin D levels); Future  -     Testosterone,Free and Total; Future  -     Uric Acid; Future  -     CBC and Auto Differential; Future  -     Comprehensive Metabolic Panel; Future  -     Lipid Panel; Future  -     Hemoglobin A1C; Future  -     TSH with reflex to Free T4 if abnormal; Future  -     Vitamin D 25-Hydroxy,Total (for eval of Vitamin D levels); Future  -     Uric Acid; Future  Vitamin D deficiency  -     Comprehensive Metabolic Panel; Future  -     Lipid Panel; Future  -     Hemoglobin A1C; Future  -     TSH with reflex to Free T4 if abnormal; Future  -     Vitamin D 25-Hydroxy,Total (for eval of Vitamin D levels); Future  -     Testosterone,Free and Total; Future  -     Uric Acid; Future  -     CBC and Auto Differential; Future  -     Comprehensive Metabolic Panel; Future  -     Lipid Panel; Future  -     Hemoglobin A1C; Future  -     TSH with reflex to Free T4 if abnormal; Future  -     Vitamin D 25-Hydroxy,Total (for eval of Vitamin D levels); Future  -     Uric Acid; Future  History of gout  -     Comprehensive Metabolic Panel; Future  -     Lipid Panel; Future  -     Hemoglobin A1C; Future  -     TSH with reflex to Free T4 if abnormal; Future  -     Vitamin D 25-Hydroxy,Total (for eval of Vitamin D levels); Future  -      Testosterone,Free and Total; Future  -     Uric Acid; Future  -     CBC and Auto Differential; Future  -     Comprehensive Metabolic Panel; Future  -     Lipid Panel; Future  -     Hemoglobin A1C; Future  -     TSH with reflex to Free T4 if abnormal; Future  -     Vitamin D 25-Hydroxy,Total (for eval of Vitamin D levels); Future  -     Uric Acid; Future    Current Outpatient Medications   Medication Instructions    alfuzosin (UROXATRAL) 10 mg, oral, Daily    allopurinol (ZYLOPRIM) 300 mg, oral, Daily    amLODIPine (NORVASC) 2.5 mg, oral, Daily    cholecalciferol (Vitamin D3) 5,000 Units tablet     finasteride (PROSCAR) 5 mg, oral, Daily    magnesium oxide (MAG-OX) 400 mg, Daily    multivitamin tablet One-A-Day Men's 50+ Complete Multivitamin.    POTASSIUM ORAL Take by mouth.     Subjective   - The patient otherwise feels well and denies any acute symptoms or concerns at this time.  - The patient denies any changes or progression of their chronic medical problems.  - The patient denies any problems or concerns with their medications.      Review of Systems   Constitutional: Negative.    HENT: Negative.     Eyes: Negative.    Respiratory: Negative.     Cardiovascular: Negative.    Gastrointestinal: Negative.    Endocrine: Negative.    Genitourinary: Negative.    Musculoskeletal: Negative.    Skin: Negative.    Allergic/Immunologic: Negative.    Neurological: Negative.    Hematological: Negative.    Psychiatric/Behavioral: Negative.     All other systems reviewed and are negative.     Objective   Vitals:    07/03/25 0801   BP: 126/80   Pulse: 74   Temp: 36.2 °C (97.2 °F)   SpO2: 95%     Body mass index is 40.35 kg/m².  Physical Exam  Vitals and nursing note reviewed.   Constitutional:       General: He is not in acute distress.     Appearance: Normal appearance. He is not ill-appearing.   HENT:      Head: Normocephalic and atraumatic.      Right Ear: Tympanic membrane, ear canal and external ear normal. There is no  impacted cerumen.      Left Ear: Tympanic membrane, ear canal and external ear normal. There is no impacted cerumen.      Nose: Nose normal.      Mouth/Throat:      Mouth: Mucous membranes are moist.      Pharynx: Oropharynx is clear. No oropharyngeal exudate or posterior oropharyngeal erythema.   Eyes:      General: No scleral icterus.        Right eye: No discharge.         Left eye: No discharge.      Extraocular Movements: Extraocular movements intact.      Conjunctiva/sclera: Conjunctivae normal.      Pupils: Pupils are equal, round, and reactive to light.   Neck:      Vascular: No carotid bruit.   Cardiovascular:      Rate and Rhythm: Normal rate and regular rhythm.      Pulses: Normal pulses.      Heart sounds: Normal heart sounds. No murmur heard.     No friction rub. No gallop.   Pulmonary:      Effort: Pulmonary effort is normal. No respiratory distress.      Breath sounds: Normal breath sounds.   Abdominal:      General: Abdomen is flat. Bowel sounds are normal.      Palpations: Abdomen is soft.      Tenderness: There is no abdominal tenderness.      Hernia: No hernia is present.   Musculoskeletal:         General: No swelling. Normal range of motion.      Cervical back: Normal range of motion.   Lymphadenopathy:      Cervical: No cervical adenopathy.   Skin:     General: Skin is warm and dry.      Coloration: Skin is not jaundiced.      Findings: No rash.   Neurological:      General: No focal deficit present.      Mental Status: He is alert and oriented to person, place, and time. Mental status is at baseline.   Psychiatric:         Mood and Affect: Mood normal.         Behavior: Behavior normal.       Diagnostic Results   Lab Results   Component Value Date    GLUCOSE 94 04/12/2024    CALCIUM 9.7 04/12/2024     04/12/2024    K 4.3 04/12/2024    CO2 23 (L) 04/12/2024     04/12/2024    BUN 20 04/12/2024    CREATININE 1.10 04/12/2024     Lab Results   Component Value Date    ALT 25 04/12/2024  "   AST 26 04/12/2024    ALKPHOS 98 04/12/2024    BILITOT 0.6 04/12/2024     Lab Results   Component Value Date    WBC 4.9 06/27/2025    HGB 15.0 06/27/2025    HCT 44.8 06/27/2025    MCV 90.9 06/27/2025     06/27/2025     Lab Results   Component Value Date    CHOL 174 04/12/2024    CHOL 172 09/29/2023    CHOL 192 05/27/2022     Lab Results   Component Value Date    HDL 53.0 04/12/2024    HDL 50 09/29/2023    HDL 42 05/27/2022     Lab Results   Component Value Date    LDLCALC 107 04/12/2024    LDLCALC 111 09/29/2023    LDLCALC 136 (H) 05/27/2022     Lab Results   Component Value Date    TRIG 68 04/12/2024    TRIG 57 09/29/2023    TRIG 69 05/27/2022     No components found for: \"CHOLHDL\"  Lab Results   Component Value Date    HGBA1C 5.4 04/12/2024     Other labs not included in the list above were reviewed either before or during this encounter.    History   Medical History[1]  Surgical History[2]  Family History[3]  Social History     Socioeconomic History    Marital status:      Spouse name: Not on file    Number of children: Not on file    Years of education: Not on file    Highest education level: Not on file   Occupational History    Not on file   Tobacco Use    Smoking status: Never    Smokeless tobacco: Never   Vaping Use    Vaping status: Never Used   Substance and Sexual Activity    Alcohol use: Never    Drug use: Never    Sexual activity: Not Currently     Partners: Female     Birth control/protection: Male Sterilization   Other Topics Concern    Not on file   Social History Narrative    Not on file     Social Drivers of Health     Financial Resource Strain: Not on file   Food Insecurity: Not on file   Transportation Needs: Not on file   Physical Activity: Not on file   Stress: Not on file   Social Connections: Not on file   Intimate Partner Violence: Not on file   Housing Stability: Not on file     Allergies[4]  Medications Ordered Prior to Encounter[5]  Immunization History   Administered " Date(s) Administered    Moderna COVID-19 vaccine, 12 years and older (50mcg/0.5mL)(Spikevax) 01/27/2024    Moderna SARS-CoV-2 Vaccination 03/25/2021, 04/22/2021, 12/02/2021    Pfizer COVID-19 vaccine, bivalent, age 12 years and older (30 mcg/0.3 mL) 09/30/2022    Tdap vaccine, age 7 year and older (BOOSTRIX, ADACEL) 04/12/2024    Zoster vaccine, recombinant, adult (SHINGRIX) 03/02/2021, 06/02/2021     Patient's medical history was reviewed and updated either before or during this encounter.       Jorgito Sullivan MD         [1]   Past Medical History:  Diagnosis Date    Headache 1980    History of umbilical hernia repair 08/2022    HL (hearing loss) 2010    Hypertension May 2024   [2]   Past Surgical History:  Procedure Laterality Date    HERNIA REPAIR  2022    MR GAMMA KNIFE TREATMENT PLANNING BRAIN MRI W OR WO CONTRAST      NASAL POLYP EXCISION      NASAL SEPTUM SURGERY      OTHER SURGICAL HISTORY  10/25/2013    Oral Surgery    OTHER SURGICAL HISTORY  11/11/2022    Umbilical hernia repair    ROTATOR CUFF REPAIR  10/25/2013    Rotator Cuff Repair    SINUS SURGERY  2014    VASECTOMY  1997    WISDOM TOOTH EXTRACTION  1992   [3]   Family History  Problem Relation Name Age of Onset    No Known Problems Mother      COPD Father [facility].     Diabetes Father [facility].     No Known Problems Sister      Alcohol abuse Father [facility].     Hypertension Father [facility].    [4] No Known Allergies  [5]   Current Outpatient Medications on File Prior to Visit   Medication Sig Dispense Refill    alfuzosin (Uroxatral) 10 mg 24 hr tablet Take 1 tablet (10 mg) by mouth once daily. 30 tablet 11    allopurinol (Zyloprim) 300 mg tablet TAKE 1 TABLET BY MOUTH ONCE DAILY. 30 tablet 11    amLODIPine (Norvasc) 2.5 mg tablet TAKE 1 TABLET BY MOUTH ONCE DAILY. 90 tablet 3    cholecalciferol (Vitamin D3) 5,000 Units tablet       finasteride (Proscar) 5 mg tablet Take 1 tablet (5 mg) by mouth once daily. 30  tablet 11    magnesium oxide (Mag-Ox) 400 mg tablet 1 tablet (400 mg) once daily.      multivitamin tablet One-A-Day Men's 50+ Complete Multivitamin.      POTASSIUM ORAL Take by mouth.       No current facility-administered medications on file prior to visit.

## 2025-07-03 NOTE — PATIENT INSTRUCTIONS
- Overall, the patient feels well and denies any acute symptoms or concerns at this time.  - Blood pressure at goal today.  - Encouraged continued dietary, exercise, and lifestyle modification.  - Encouraged continued use of CPAP.  - Significant medication and problem list reconciliation done today.     Discussed routine and/or preventative care with the patient as outlined below:  - Labwork:   - Patient appears to be due for labwork. Ordered today. I had ordered labwork; however, it appears that most of it did not get done despite the patient going to the lab.  - Will order labwork for the patient's next appointment. Encouraged the patient to get this labwork done one week prior to the next appointment.  - Imaging:   - Colorectal Cancer: Patient will be due for this until 2026.  - Immunizations:   - Patient does not appear to be due for routine immunizations at this time.

## 2025-07-04 LAB
25(OH)D3+25(OH)D2 SERPL-MCNC: 53 NG/ML (ref 30–100)
ALBUMIN SERPL-MCNC: 4.4 G/DL (ref 3.6–5.1)
ALP SERPL-CCNC: 67 U/L (ref 35–144)
ALT SERPL-CCNC: 16 U/L (ref 9–46)
ANION GAP SERPL CALCULATED.4IONS-SCNC: 7 MMOL/L (CALC) (ref 7–17)
AST SERPL-CCNC: 18 U/L (ref 10–35)
BILIRUB SERPL-MCNC: 0.4 MG/DL (ref 0.2–1.2)
BUN SERPL-MCNC: 20 MG/DL (ref 7–25)
CALCIUM SERPL-MCNC: 9.3 MG/DL (ref 8.6–10.3)
CHLORIDE SERPL-SCNC: 107 MMOL/L (ref 98–110)
CHOLEST SERPL-MCNC: 174 MG/DL
CHOLEST/HDLC SERPL: 3.5 (CALC)
CO2 SERPL-SCNC: 28 MMOL/L (ref 20–32)
CREAT SERPL-MCNC: 1.07 MG/DL (ref 0.7–1.3)
EGFRCR SERPLBLD CKD-EPI 2021: 80 ML/MIN/1.73M2
EST. AVERAGE GLUCOSE BLD GHB EST-MCNC: 114 MG/DL
EST. AVERAGE GLUCOSE BLD GHB EST-SCNC: 6.3 MMOL/L
GLUCOSE SERPL-MCNC: 118 MG/DL (ref 65–99)
HBA1C MFR BLD: 5.6 %
HDLC SERPL-MCNC: 50 MG/DL
LDLC SERPL CALC-MCNC: 110 MG/DL (CALC)
NONHDLC SERPL-MCNC: 124 MG/DL (CALC)
POTASSIUM SERPL-SCNC: 4.7 MMOL/L (ref 3.5–5.3)
PROT SERPL-MCNC: 6.9 G/DL (ref 6.1–8.1)
PSA SERPL-MCNC: 0.3 NG/ML
SODIUM SERPL-SCNC: 142 MMOL/L (ref 135–146)
TESTOST FREE SERPL-MCNC: NORMAL PG/ML
TESTOST SERPL-MCNC: NORMAL NG/DL
TRIGL SERPL-MCNC: 59 MG/DL
TSH SERPL-ACNC: 0.77 MIU/L (ref 0.4–4.5)
URATE SERPL-MCNC: 5.4 MG/DL (ref 4–8)

## 2025-07-08 LAB
25(OH)D3+25(OH)D2 SERPL-MCNC: 53 NG/ML (ref 30–100)
ALBUMIN SERPL-MCNC: 4.4 G/DL (ref 3.6–5.1)
ALP SERPL-CCNC: 67 U/L (ref 35–144)
ALT SERPL-CCNC: 16 U/L (ref 9–46)
ANION GAP SERPL CALCULATED.4IONS-SCNC: 7 MMOL/L (CALC) (ref 7–17)
AST SERPL-CCNC: 18 U/L (ref 10–35)
BILIRUB SERPL-MCNC: 0.4 MG/DL (ref 0.2–1.2)
BUN SERPL-MCNC: 20 MG/DL (ref 7–25)
CALCIUM SERPL-MCNC: 9.3 MG/DL (ref 8.6–10.3)
CHLORIDE SERPL-SCNC: 107 MMOL/L (ref 98–110)
CHOLEST SERPL-MCNC: 174 MG/DL
CHOLEST/HDLC SERPL: 3.5 (CALC)
CO2 SERPL-SCNC: 28 MMOL/L (ref 20–32)
CREAT SERPL-MCNC: 1.07 MG/DL (ref 0.7–1.3)
EGFRCR SERPLBLD CKD-EPI 2021: 80 ML/MIN/1.73M2
EST. AVERAGE GLUCOSE BLD GHB EST-MCNC: 114 MG/DL
EST. AVERAGE GLUCOSE BLD GHB EST-SCNC: 6.3 MMOL/L
GLUCOSE SERPL-MCNC: 118 MG/DL (ref 65–99)
HBA1C MFR BLD: 5.6 %
HDLC SERPL-MCNC: 50 MG/DL
LDLC SERPL CALC-MCNC: 110 MG/DL (CALC)
NONHDLC SERPL-MCNC: 124 MG/DL (CALC)
POTASSIUM SERPL-SCNC: 4.7 MMOL/L (ref 3.5–5.3)
PROT SERPL-MCNC: 6.9 G/DL (ref 6.1–8.1)
PSA SERPL-MCNC: 0.3 NG/ML
SODIUM SERPL-SCNC: 142 MMOL/L (ref 135–146)
TESTOST FREE SERPL-MCNC: 55.2 PG/ML (ref 35–155)
TESTOST SERPL-MCNC: 389 NG/DL (ref 250–1100)
TRIGL SERPL-MCNC: 59 MG/DL
TSH SERPL-ACNC: 0.77 MIU/L (ref 0.4–4.5)
URATE SERPL-MCNC: 5.4 MG/DL (ref 4–8)

## 2025-07-21 ENCOUNTER — APPOINTMENT (OUTPATIENT)
Dept: UROLOGY | Facility: CLINIC | Age: 60
End: 2025-07-21
Payer: COMMERCIAL

## 2025-08-01 LAB — PSA SERPL-MCNC: 0.31 NG/ML

## 2025-08-02 NOTE — PROGRESS NOTES
Patient is a 59 y.o. male presenting for followup with PM of BPH, urinary retention and scrotal swelling.    SUBJECTIVE:  HPI   He has a history of BPH, treated with alfuzosin and finasteride which was initiated in 2023. He has had nocturia without daytime frequency. He did have prior moderate urinary retention. He feels that he is voiding well, however, he said that he has trouble urinating in the office and usually urinates better at home.     He did have concerns regarding swelling of one of his testicles. He was evaluated with scrotal ultrasound in January 2025.    Review of Systems  Pertinent findings noted in the HPI.     OBJECTIVE:  There were no vitals taken for this visit.    Physical Exam   Constitutional: No obvious distress.  Cardiovascular: Extremities are warm and well perfused.  Respiratory: No audible wheezing/stridor; respirations do not appear labored.  Neurologic: Alert and oriented x3.  Genitourinary: No CVA tenderness, bladder not palpable.      Labs:  Lab Results   Component Value Date    WBC 4.9 06/27/2025    HGB 15.0 06/27/2025    HCT 44.8 06/27/2025    MCV 90.9 06/27/2025     06/27/2025    GLUCOSE 118 (H) 07/03/2025    CALCIUM 9.3 07/03/2025     07/03/2025    K 4.7 07/03/2025    CO2 28 07/03/2025     07/03/2025    BUN 20 07/03/2025    CREATININE 1.07 07/03/2025    EGFR 80 07/03/2025    URICACID 5.4 07/03/2025    PSA 0.31 08/01/2025     IMAGING:  === 01/24/25 ===  US SCROTUM WITH DOPPLERS  - Impression -  The testicles appear sonographically normal.    Complex left scrotal fluid collection 4.1 x 5.3 x 6.9 cm, favored to  represent a large spermatocele or dilated ductus deferens, contains  tiny foci of debris and is slightly loculated above the testicle,  tubular appearing. There is also small left hydrocele. Possible left  varicocele.    Bilateral epididymal head cysts. Spongy appearance of the right  epididymal body and tail may be secondary to history of  vasectomy.    Small right hydrocele noted.    PROCEDURES:  PVR: 389 mL  (8/4/2025)    ASSESSMENT/PLAN:  Problem List Items Addressed This Visit    None  Visit Diagnoses         Prostate cancer screening        Relevant Orders    PSA (Completed)      Urinary symptom or sign        Relevant Orders    Measure post void residual (Completed)    POCT UA Automated manually resulted (Completed)           He has a history of BPH treated with alfuzosin and finasteride which was initiated in 2023.  He was retaining 170 ml in the bladder in October 2024. PVR is increased today at 389 mL; however, he feels that he empties well and states that he has difficulty urinating in the office and voids better at home. He will be monitored.     We discussed cystoscopy results, and treatment and laser vaporization was recommended. Risks were reviewed.  He will contact the office if he would like to be scheduled for the procedure.    He is seen for prostate cancer screening.    PSA summary  08/01/2025 0.31  07/03/2025 0.30  07/19/2024 0.44  04/12/2024 0.30  09/29/2023 0.4  07/14/2023 0.6  07/08/2022 0.7    He has a possible hydrocele. Scrotal ultrasound (1/24/2025) was viewed with the patient and identified a likely left spermatocele. We discussed treatment, and he elected for conservative management.    He will follow up in 1 year with PSA prior.     All questions were answered to the patient’s satisfaction.  Patient agrees with the plan and wishes to proceed.  Follow-up will be scheduled appropriately.     IAmanda, am scribing for, and in the presence of Joe Mckeon MD.     IJoe MD, personally performed the services described in the documentation as scribed by Amanda Payton, in my presence, and confirm it is both accurate and complete.

## 2025-08-04 ENCOUNTER — APPOINTMENT (OUTPATIENT)
Dept: UROLOGY | Facility: CLINIC | Age: 60
End: 2025-08-04
Payer: COMMERCIAL

## 2025-08-04 DIAGNOSIS — Z12.5 PROSTATE CANCER SCREENING: ICD-10-CM

## 2025-08-04 DIAGNOSIS — R39.9 URINARY SYMPTOM OR SIGN: ICD-10-CM

## 2025-08-04 DIAGNOSIS — R39.12 BENIGN PROSTATIC HYPERPLASIA WITH WEAK URINARY STREAM: ICD-10-CM

## 2025-08-04 DIAGNOSIS — N43.40 SPERMATOCELE: Primary | ICD-10-CM

## 2025-08-04 DIAGNOSIS — R33.9 URINARY RETENTION: ICD-10-CM

## 2025-08-04 DIAGNOSIS — N40.1 BENIGN PROSTATIC HYPERPLASIA WITH WEAK URINARY STREAM: ICD-10-CM

## 2025-08-04 LAB
POC APPEARANCE, URINE: CLEAR
POC BILIRUBIN, URINE: NEGATIVE
POC BLOOD, URINE: NEGATIVE
POC COLOR, URINE: YELLOW
POC GLUCOSE, URINE: NEGATIVE MG/DL
POC KETONES, URINE: NEGATIVE MG/DL
POC LEUKOCYTES, URINE: NEGATIVE
POC NITRITE,URINE: NEGATIVE
POC PH, URINE: 5.5 PH
POC PROTEIN, URINE: NEGATIVE MG/DL
POC SPECIFIC GRAVITY, URINE: <=1.005
POC UROBILINOGEN, URINE: 0.2 EU/DL

## 2025-08-04 PROCEDURE — 99214 OFFICE O/P EST MOD 30 MIN: CPT | Performed by: UROLOGY

## 2025-08-04 PROCEDURE — 81003 URINALYSIS AUTO W/O SCOPE: CPT | Performed by: UROLOGY

## 2025-08-04 PROCEDURE — 1036F TOBACCO NON-USER: CPT | Performed by: UROLOGY

## 2025-08-04 ASSESSMENT — PAIN SCALES - GENERAL: PAINLEVEL_OUTOF10: 0-NO PAIN

## 2025-09-29 ENCOUNTER — APPOINTMENT (OUTPATIENT)
Dept: UROLOGY | Facility: CLINIC | Age: 60
End: 2025-09-29
Payer: COMMERCIAL

## 2026-08-10 ENCOUNTER — APPOINTMENT (OUTPATIENT)
Dept: UROLOGY | Facility: CLINIC | Age: 61
End: 2026-08-10
Payer: COMMERCIAL